# Patient Record
Sex: FEMALE | Race: WHITE | Employment: FULL TIME | URBAN - METROPOLITAN AREA
[De-identification: names, ages, dates, MRNs, and addresses within clinical notes are randomized per-mention and may not be internally consistent; named-entity substitution may affect disease eponyms.]

---

## 2018-08-04 ENCOUNTER — APPOINTMENT (OUTPATIENT)
Dept: GENERAL RADIOLOGY | Age: 33
End: 2018-08-04
Payer: COMMERCIAL

## 2018-08-04 ENCOUNTER — HOSPITAL ENCOUNTER (EMERGENCY)
Age: 33
Discharge: HOME | End: 2018-08-04
Attending: EMERGENCY MEDICINE
Payer: COMMERCIAL

## 2018-08-04 VITALS
HEART RATE: 78 BPM | DIASTOLIC BLOOD PRESSURE: 87 MMHG | HEIGHT: 60 IN | RESPIRATION RATE: 16 BRPM | WEIGHT: 189 LBS | BODY MASS INDEX: 37.11 KG/M2 | SYSTOLIC BLOOD PRESSURE: 134 MMHG | TEMPERATURE: 98.2 F | OXYGEN SATURATION: 100 %

## 2018-08-04 DIAGNOSIS — R06.02 SHORTNESS OF BREATH: Primary | ICD-10-CM

## 2018-08-04 DIAGNOSIS — J20.9 BRONCHOSPASM WITH BRONCHITIS, ACUTE: ICD-10-CM

## 2018-08-04 LAB
A/G RATIO: 1.4 (ref 1.1–2.2)
ALBUMIN SERPL-MCNC: 4.2 G/DL (ref 3.4–5)
ALP BLD-CCNC: 77 U/L (ref 40–129)
ALT SERPL-CCNC: 34 U/L (ref 10–40)
ANION GAP SERPL CALCULATED.3IONS-SCNC: 11 MMOL/L (ref 3–16)
AST SERPL-CCNC: 22 U/L (ref 15–37)
BACTERIA: ABNORMAL /HPF
BASOPHILS ABSOLUTE: 0 K/UL (ref 0–0.2)
BASOPHILS RELATIVE PERCENT: 0.2 %
BILIRUB SERPL-MCNC: 0.3 MG/DL (ref 0–1)
BILIRUBIN URINE: NEGATIVE
BLOOD, URINE: ABNORMAL
BUN BLDV-MCNC: 18 MG/DL (ref 7–20)
CALCIUM SERPL-MCNC: 8.7 MG/DL (ref 8.3–10.6)
CHLORIDE BLD-SCNC: 104 MMOL/L (ref 99–110)
CLARITY: ABNORMAL
CO2: 26 MMOL/L (ref 21–32)
COLOR: YELLOW
CREAT SERPL-MCNC: 0.7 MG/DL (ref 0.6–1.1)
EOSINOPHILS ABSOLUTE: 0.2 K/UL (ref 0–0.6)
EOSINOPHILS RELATIVE PERCENT: 2.3 %
EPITHELIAL CELLS, UA: ABNORMAL /HPF
GFR AFRICAN AMERICAN: >60
GFR NON-AFRICAN AMERICAN: >60
GLOBULIN: 3 G/DL
GLUCOSE BLD-MCNC: 119 MG/DL (ref 70–99)
GLUCOSE URINE: NEGATIVE MG/DL
HCG(URINE) PREGNANCY TEST: NEGATIVE
HCT VFR BLD CALC: 42.3 % (ref 36–48)
HEMOGLOBIN: 14.6 G/DL (ref 12–16)
KETONES, URINE: NEGATIVE MG/DL
LEUKOCYTE ESTERASE, URINE: NEGATIVE
LYMPHOCYTES ABSOLUTE: 1.8 K/UL (ref 1–5.1)
LYMPHOCYTES RELATIVE PERCENT: 26 %
MCH RBC QN AUTO: 32.9 PG (ref 26–34)
MCHC RBC AUTO-ENTMCNC: 34.5 G/DL (ref 31–36)
MCV RBC AUTO: 95.3 FL (ref 80–100)
MICROSCOPIC EXAMINATION: YES
MONOCYTES ABSOLUTE: 0.4 K/UL (ref 0–1.3)
MONOCYTES RELATIVE PERCENT: 5.7 %
NEUTROPHILS ABSOLUTE: 4.7 K/UL (ref 1.7–7.7)
NEUTROPHILS RELATIVE PERCENT: 65.8 %
NITRITE, URINE: NEGATIVE
PDW BLD-RTO: 13.8 % (ref 12.4–15.4)
PH UA: 5.5
PLATELET # BLD: 272 K/UL (ref 135–450)
PMV BLD AUTO: 7.8 FL (ref 5–10.5)
POTASSIUM SERPL-SCNC: 3.7 MMOL/L (ref 3.5–5.1)
PROTEIN UA: NEGATIVE MG/DL
RBC # BLD: 4.44 M/UL (ref 4–5.2)
RBC UA: ABNORMAL /HPF (ref 0–2)
SODIUM BLD-SCNC: 141 MMOL/L (ref 136–145)
SPECIFIC GRAVITY UA: 1.02
TOTAL PROTEIN: 7.2 G/DL (ref 6.4–8.2)
URINE TYPE: ABNORMAL
UROBILINOGEN, URINE: 0.2 E.U./DL
WBC # BLD: 7.1 K/UL (ref 4–11)
WBC UA: ABNORMAL /HPF (ref 0–5)

## 2018-08-04 PROCEDURE — 80053 COMPREHEN METABOLIC PANEL: CPT

## 2018-08-04 PROCEDURE — 6360000002 HC RX W HCPCS: Performed by: EMERGENCY MEDICINE

## 2018-08-04 PROCEDURE — 84703 CHORIONIC GONADOTROPIN ASSAY: CPT

## 2018-08-04 PROCEDURE — 71045 X-RAY EXAM CHEST 1 VIEW: CPT

## 2018-08-04 PROCEDURE — 85025 COMPLETE CBC W/AUTO DIFF WBC: CPT

## 2018-08-04 PROCEDURE — 99285 EMERGENCY DEPT VISIT HI MDM: CPT

## 2018-08-04 PROCEDURE — 96365 THER/PROPH/DIAG IV INF INIT: CPT

## 2018-08-04 PROCEDURE — 81001 URINALYSIS AUTO W/SCOPE: CPT

## 2018-08-04 PROCEDURE — 6370000000 HC RX 637 (ALT 250 FOR IP): Performed by: EMERGENCY MEDICINE

## 2018-08-04 RX ORDER — AZITHROMYCIN 250 MG/1
TABLET, FILM COATED ORAL
Qty: 1 PACKET | Refills: 0 | Status: SHIPPED | OUTPATIENT
Start: 2018-08-04 | End: 2018-08-08

## 2018-08-04 RX ORDER — PREDNISONE 50 MG/1
50 TABLET ORAL DAILY
Qty: 5 TABLET | Refills: 0 | Status: SHIPPED | OUTPATIENT
Start: 2018-08-04 | End: 2018-08-09

## 2018-08-04 RX ORDER — MAGNESIUM SULFATE IN WATER 40 MG/ML
2 INJECTION, SOLUTION INTRAVENOUS ONCE
Status: COMPLETED | OUTPATIENT
Start: 2018-08-04 | End: 2018-08-04

## 2018-08-04 RX ORDER — ALBUTEROL SULFATE 2.5 MG/3ML
2.5 SOLUTION RESPIRATORY (INHALATION) ONCE
Status: COMPLETED | OUTPATIENT
Start: 2018-08-04 | End: 2018-08-04

## 2018-08-04 RX ORDER — IPRATROPIUM BROMIDE AND ALBUTEROL SULFATE 2.5; .5 MG/3ML; MG/3ML
1 SOLUTION RESPIRATORY (INHALATION) ONCE
Status: COMPLETED | OUTPATIENT
Start: 2018-08-04 | End: 2018-08-04

## 2018-08-04 RX ORDER — PREDNISONE 20 MG/1
60 TABLET ORAL ONCE
Status: COMPLETED | OUTPATIENT
Start: 2018-08-04 | End: 2018-08-04

## 2018-08-04 RX ADMIN — MAGNESIUM SULFATE HEPTAHYDRATE 2 G: 40 INJECTION, SOLUTION INTRAVENOUS at 08:13

## 2018-08-04 RX ADMIN — ALBUTEROL SULFATE 2.5 MG: 2.5 SOLUTION RESPIRATORY (INHALATION) at 08:52

## 2018-08-04 RX ADMIN — IPRATROPIUM BROMIDE AND ALBUTEROL SULFATE 1 AMPULE: .5; 3 SOLUTION RESPIRATORY (INHALATION) at 08:13

## 2018-08-04 RX ADMIN — PREDNISONE 60 MG: 20 TABLET ORAL at 08:13

## 2018-08-04 NOTE — ED TRIAGE NOTES
Chief Complaint   Patient presents with    Shortness of Breath     Pt presents with c/o shortness of breath that started yesterday.  +cough and congestion. Has been told that she has COPD.   Denies fever  Denies n/v.

## 2018-08-04 NOTE — ED PROVIDER NOTES
Marital status:      Spouse name: N/A    Number of children: N/A    Years of education: N/A     Occupational History    Not on file. Social History Main Topics    Smoking status: Current Every Day Smoker     Packs/day: 3.00     Types: Cigarettes    Smokeless tobacco: Never Used    Alcohol use No    Drug use: Yes      Comment: narcotics    Sexual activity: Yes     Partners: Male     Other Topics Concern    Not on file     Social History Narrative    No narrative on file     No current facility-administered medications for this encounter. Current Outpatient Prescriptions   Medication Sig Dispense Refill    predniSONE (DELTASONE) 50 MG tablet Take 1 tablet by mouth daily for 5 days 5 tablet 0    azithromycin (ZITHROMAX Z-KENYA) 250 MG tablet Take 2 tablets (500 mg) on Day 1, and then take 1 tablet (250 mg) on days 2 through 5. 1 packet 0    albuterol sulfate HFA (PROVENTIL HFA) 108 (90 Base) MCG/ACT inhaler Inhale 2 puffs into the lungs every 4 hours as needed for Wheezing or Shortness of Breath (Space out to every 6 hours as symptoms improve) Space out to every 6 hours as symptoms improve. 1 Inhaler 0    guaiFENesin 400 MG tablet Take 1 tablet by mouth 4 times daily as needed for Cough. 56 tablet 0    Buprenorphine HCl-Naloxone HCl (SUBOXONE) 8-2 MG FILM Place 1 Film under the tongue 2 times daily. Allergies   Allergen Reactions    Penicillins Anaphylaxis    Cephalexin Hives    Tramadol Hives       Nursing Notes Reviewed    Physical Exam:  ED Triage Vitals [08/04/18 0748]   Enc Vitals Group      BP (!) 121/95      Pulse 80      Resp 16      Temp 98.2 °F (36.8 °C)      Temp Source Oral      SpO2 100 %      Weight 189 lb (85.7 kg)      Height 5' (1.524 m)      Head Circumference       Peak Flow       Pain Score       Pain Loc       Pain Edu? Excl. in [address]? My pulse ox interpretation is - normal    General appearance:  No acute distress.  Able to talk in sentences, does African American >60 >60    Calcium 8.7 8.3 - 10.6 mg/dL    Total Protein 7.2 6.4 - 8.2 g/dL    Alb 4.2 3.4 - 5.0 g/dL    Albumin/Globulin Ratio 1.4 1.1 - 2.2    Total Bilirubin 0.3 0.0 - 1.0 mg/dL    Alkaline Phosphatase 77 40 - 129 U/L    ALT 34 10 - 40 U/L    AST 22 15 - 37 U/L    Globulin 3.0 g/dL   Pregnancy, Urine   Result Value Ref Range    HCG(Urine) Pregnancy Test Negative Detects HCG level >20 MIU/mL   Urinalysis   Result Value Ref Range    Color, UA Yellow Straw/Yellow    Clarity, UA SL CLOUDY (A) Clear    Glucose, Ur Negative Negative mg/dL    Bilirubin Urine Negative Negative    Ketones, Urine Negative Negative mg/dL    Specific Gravity, UA 1.025 1.005 - 1.030    Blood, Urine TRACE-INTACT (A) Negative    pH, UA 5.5 5.0 - 8.0    Protein, UA Negative Negative mg/dL    Urobilinogen, Urine 0.2 <2.0 E.U./dL    Nitrite, Urine Negative Negative    Leukocyte Esterase, Urine Negative Negative    Microscopic Examination YES     Urine Type Not Specified    Microscopic Urinalysis   Result Value Ref Range    WBC, UA 3-5 0 - 5 /HPF    RBC, UA 3-5 (A) 0 - 2 /HPF    Epi Cells 10-20 /HPF    Bacteria, UA 2+ (A) /HPF      Radiographs (if obtained):  [] The following radiograph was interpreted by myself in the absence of a radiologist:   [x] Radiologist's Report Reviewed:  XR CHEST PORTABLE   Preliminary Result   No acute cardiopulmonary process               EKG (if obtained): (All EKG's are interpreted by myself in the absence of a cardiologist)    Chart review shows recent radiographs:  No results found. MDM:  70-year-old female with shortness of breath. I suspect this is viral in nature, however, cannot completely rule out an atypical bronchitis. She has responded nicely to breathing treatments. I will continue steroids. I have encouraged her to drink extra fluids and absolutely no smoking. She should  follow-up with primary care and can return for any problems or if worse. Clinical Impression:  1.  Shortness of breath    2. Bronchospasm with bronchitis, acute      Disposition referral (if applicable):  [facility]) Pre-Services  [phone]        Disposition medications (if applicable):  New Prescriptions    AZITHROMYCIN (ZITHROMAX Z-KENYA) 250 MG TABLET    Take 2 tablets (500 mg) on Day 1, and then take 1 tablet (250 mg) on days 2 through 5. PREDNISONE (DELTASONE) 50 MG TABLET    Take 1 tablet by mouth daily for 5 days       Comment: Please note this report has been produced using speech recognition software and may contain errors related to that system including errors in grammar, punctuation, and spelling, as well as words and phrases that may be inappropriate. If there are any questions or concerns please feel free to contact the dictating provider for clarification. Lexi Livingston MD  08/04/18 6649

## 2018-08-04 NOTE — LETTER
[facility] [place] [facility]  [address] [address]  Phone: [phone]               August 4, 2018    Patient: Nury Brady   YOB: 1985   Date of Visit: 8/4/2018       To Whom It May Concern:    Nury Brady was seen and treated in our emergency department on 8/4/2018. She may return to work on 08/06/2018.       Sincerely,       Rosa Santiago RN         Signature:__________________________________

## 2019-04-12 ENCOUNTER — HOSPITAL ENCOUNTER (EMERGENCY)
Age: 34
Discharge: HOME | End: 2019-04-13
Payer: COMMERCIAL

## 2019-04-12 ENCOUNTER — APPOINTMENT (OUTPATIENT)
Dept: GENERAL RADIOLOGY | Age: 34
End: 2019-04-12
Payer: COMMERCIAL

## 2019-04-12 VITALS
BODY MASS INDEX: 33.57 KG/M2 | HEIGHT: 60 IN | SYSTOLIC BLOOD PRESSURE: 129 MMHG | DIASTOLIC BLOOD PRESSURE: 81 MMHG | RESPIRATION RATE: 20 BRPM | HEART RATE: 103 BPM | TEMPERATURE: 98.1 F | OXYGEN SATURATION: 99 % | WEIGHT: 171 LBS

## 2019-04-12 DIAGNOSIS — R06.02 SOB (SHORTNESS OF BREATH): Primary | ICD-10-CM

## 2019-04-12 DIAGNOSIS — Z72.0 TOBACCO ABUSE: ICD-10-CM

## 2019-04-12 PROCEDURE — 99284 EMERGENCY DEPT VISIT MOD MDM: CPT

## 2019-04-12 PROCEDURE — 71046 X-RAY EXAM CHEST 2 VIEWS: CPT

## 2019-04-13 RX ORDER — IPRATROPIUM BROMIDE AND ALBUTEROL SULFATE 2.5; .5 MG/3ML; MG/3ML
1 SOLUTION RESPIRATORY (INHALATION) ONCE
Status: DISCONTINUED | OUTPATIENT
Start: 2019-04-13 | End: 2019-04-13

## 2019-04-13 RX ORDER — PREDNISONE 20 MG/1
60 TABLET ORAL ONCE
Status: DISCONTINUED | OUTPATIENT
Start: 2019-04-13 | End: 2019-04-13

## 2019-04-13 RX ORDER — ALBUTEROL SULFATE 2.5 MG/3ML
5 SOLUTION RESPIRATORY (INHALATION) ONCE
Status: DISCONTINUED | OUTPATIENT
Start: 2019-04-13 | End: 2019-04-13

## 2019-04-13 NOTE — ED PROVIDER NOTES
Evaluated by [id number] [facility] Provider    [facility]  ED  eMERGENCY dEPARTMENT eNCOUnter      CHIEFCOMPLAINT  Shortness of Breath (states he is a heavy smoker and she just can't get a deep breath in)    DIANNE Geronimo is a 28 y.o. female who presents to the emergency department with complaints ofshortness of breath. Feels like she cannot take a deep breath, She was fine yesterday and woke up like this today. Smokes 1-1.5 packs a day. Was 3 ppd smoker. Has been using the prescribed inhalers at home. States she took 3 nebulizer treatments at home and she is feeling jittery from it. Patient denies any chest pain, patient denies abdominal pain, nausea, vomiting, diarrhea. Patient denies any fever or chills. Thepatient is currently rating their pain as 0/10. Treatments tried prior to arrival in the ED include: as above. The patient denies other complaints, modifying factors orassociated symptoms. The patient arrived to the ED via private car. Patient is accompanied by family who is bedside for the visit. PAST MEDICAL HISTORY    Past Medical History:   Diagnosis Date    Adhesions; postoperative     post umbilical hernia.     Chronic abdominal pain     Hernia of unspecified site of abdominal cavity without mention of obstruction or gangrene     Narcotic abuse ([address]     Substance abuse ([address]        SURGICAL HISTORY    Past Surgical History:   Procedure Laterality Date     SECTION      CHOLECYSTECTOMY  [id number]    HERNIA REPAIR      HYSTERECTOMY      PELVIC LAPAROSCOPY         CURRENT MEDICATIONS    Current Outpatient Rx   Medication Sig Dispense Refill    albuterol-ipratropium (COMBIVENT RESPIMAT)  MCG/ACT AERS inhaler Inhale 1 puff into the lungs every 6 hours      albuterol sulfate HFA (PROVENTIL HFA) 108 (90 Base) MCG/ACT inhaler Inhale 2 puffs into the lungs every 4 hours as needed for Wheezing or Shortness of Breath (Space out to every 6 hours as symptoms improve) Space out to every 6 hours as symptoms improve. 1 Inhaler 0    Buprenorphine HCl-Naloxone HCl (SUBOXONE) 8-2 MG FILM Place 1 Film under the tongue 2 times daily.          ALLERGIES    Allergies   Allergen Reactions    Penicillins Anaphylaxis    Cephalexin Hives    Tramadol Hives       FAMILY HISTORY    Family History   Problem Relation Age of Onset    Diabetes Mother     Cancer Father        SOCIAL HISTORY    Social History     Socioeconomic History    Marital status:      Spouse name: None    Number of children: None    Years of education: None    Highest education level: None   Occupational History    None   Social Needs    Financial resource strain: None    Food insecurity:     Worry: None     Inability: None    Transportation needs:     Medical: None     Non-medical: None   Tobacco Use    Smoking status: Current Every Day Smoker     Packs/day: 1.25     Types: Cigarettes    Smokeless tobacco: Never Used   Substance and Sexual Activity    Alcohol use: No    Drug use: Yes     Comment: narcotics    Sexual activity: Yes     Partners: Male   Lifestyle    Physical activity:     Days per week: None     Minutes per session: None    Stress: None   Relationships    Social connections:     Talks on phone: None     Gets together: None     Attends [employer] service: None     Active member of club or organization: None     Attends meetings of clubs or organizations: None     Relationship status: None    Intimate partner violence:     Fear of current or ex partner: None     Emotionally abused: None     Physically abused: None     Forced sexual activity: None   Other Topics Concern    None   Social History Narrative    None       REVIEW OF SYSTEMS    10 systems reviewed, pertinent positives per HPI otherwise noted to be negative    PHYSICAL EXAM  Physical Exam  Vitals:    04/12/19 2246   BP: 129/81   Pulse: 103   Resp: 20   Temp: 98.1 °F (36.7 °C)   SpO2: 99% GENERAL: Patient is well-developed, well-nourished. Awake and alert. Cooperative. Resting in bed. No apparent distress. HEENT:  Normocephalic, atraumatic. Conjunctiva appear normal. Sclera is non-icteric. External ears are normal.    NECK: Supple with normal ROM. Trachea midline  LUNGS: Equal and symmetric chest rise. Breathing is unlabored. Speaking in full sentences. Lungs are clear but diminished bilaterally to auscultation. Without wheezing, rales, or rhonchi. Voice is hoarse sounding. CADIOVASCULAR:  Tachycardic rate and rhythm. Normal S1-S2 sounds. No murmurs, rubs, or gallops. Capillary refill is brisk in all 4extremities. Bilateral lower extremities are equal in size, there is no swelling observed. There is no tenderness to palpation. There is no erythema observed or warmth palpated. GI: Soft, nontender, nondistended with positive bowelsounds. No rebound tenderness, guarding or any peritoneal signs. No masses or hepatosplenomegaly   MUSCULOSKELETAL:  No gross deformities or trauma noted. Moving allextremities equally and appropriately. Normal ROM. SKIN: Warm/dry. Skin is intact. Norashes/lesions noted. PSYCHIATRIC: Mood and affect appropriate. Speech is clear andarticulate. NEUROLOGIC: Alert and oriented. No focal motor or sensory deficits. Gait was observed and isnormal.    LABS  I havereviewed all labs for this visit. No results found for this visit on 04/12/19. RADIOLOGY    XR CHEST STANDARD (2 VW)   Final Result   No acute findings. ED COURSE/MDM  Patient seen and evaluated. Old records reviewed. Diagnostic testing reviewed and results discussed. I have evaluated this patient. My supervising physician was available for consultation. Lucas Muller presented to the ED with the above noted complaints. Physical exam reveals the patient to have diminished breath sounds throughout all lung fields. Her voice is very hoarse sounding.  She is well saturated on RA at 99%. CXR is without acute findings. I suspect that the patient likely has an undiagnosed COPD given her very heavy smoking history. I advised the patient that I would give her breathing treatments and start her on a steroid. Patient stated to me that she had taken 3 breathing treatments prior to coming in, this likely accounts for her tachycardia. She was refusing any more breathing treatments at this time. She also stated that she did not want to take the steroid and was refusing it as it is not helped her in the past.  I advised her for the current symptoms she is reporting in her physical exam breathing treatments and steroids is the typical treatment. Patient stated that she would just continue her breathing treatments around the clock at home. At this point I do not feel the patient requires further work up and it is reasonable to discharge the patient. Please refer to AVS for further details regarding dischargeinstructions. A discussion was had with the patient regarding diagnosis, diagnostic testing results,treatment/ plan of care, and follow up. All questions were answered. Patient will follow up as directed for further evaluation/treatment. The patient was given strict return precautions as we discussed symptoms that wouldnecessitate return to the ED. Patient will return to ED for new/worsening symptoms. The patient verbalized their understanding and agreement with the above plan. I estimate there is LOW risk for PULMONARY EMBOLISM, ACUTE CORONARY SYNDROME, OR THORACIC AORTIC DISSECTION, thus I consider the discharge disposition reasonable. Shayy Garcia and I have discussed the diagnosisand risks, and we agree with discharging home to follow-up with their primary doctor. We also discussed returning to the Emergency Department immediately if new or worsening symptoms occur.  We have discussed the symptomswhich are most concerning (e.g., bloody sputum, fever, worsening pain or shortness of breath, vomiting) that necessitate immediate return. Patient was sent home with a prescription for below medication/s. I did [place] patient on appropriate use of these medication. Discharge Medication List as of 4/13/2019 12:53 AM            CLINICAL IMPRESSION    1. SOB (shortness of breath)    2. Tobacco abuse           Discharge Vitals:  Blood pressure 129/81, pulse 103, temperature 98.1 °F (36.7 °C), temperature source Oral, resp. rate 20, height 5' (1.524 m), weight 171 lb (77.6 kg), SpO2 99 %, not currently breastfeeding. FOLLOW UP  Diaz Castillo MD  [address] [place] [address]  [phone]    Call in 3 days  For further evaluation    [facility]  ED  [facility]  [address] [phone]  Go to   If symptoms worsen      DISPOSITION  Patient was discharged to home in good condition. Comment: Please note this report has been produced using speech recognition software and may contain errors related to that system including errorsin grammar, punctuation, and spelling, as well as words and phrases that may be inappropriate. If there are any questions or concerns please feel free to contact the dictating provider for clarification.         KATIA Benjamin - CNP  04/13/19 0128

## 2019-05-28 ENCOUNTER — OFFICE VISIT (OUTPATIENT)
Dept: FAMILY MEDICINE CLINIC | Age: 34
End: 2019-05-28
Payer: COMMERCIAL

## 2019-05-28 VITALS
HEART RATE: 76 BPM | BODY MASS INDEX: 39.88 KG/M2 | OXYGEN SATURATION: 95 % | DIASTOLIC BLOOD PRESSURE: 78 MMHG | SYSTOLIC BLOOD PRESSURE: 120 MMHG | WEIGHT: 204.2 LBS | TEMPERATURE: 98.4 F

## 2019-05-28 DIAGNOSIS — J44.9 CHRONIC OBSTRUCTIVE PULMONARY DISEASE, UNSPECIFIED COPD TYPE (HCC): Primary | ICD-10-CM

## 2019-05-28 DIAGNOSIS — Z13.220 LIPID SCREENING: ICD-10-CM

## 2019-05-28 DIAGNOSIS — Z13.1 DIABETES MELLITUS SCREENING: ICD-10-CM

## 2019-05-28 DIAGNOSIS — F17.200 TOBACCO USE DISORDER: ICD-10-CM

## 2019-05-28 PROCEDURE — G8427 DOCREV CUR MEDS BY ELIG CLIN: HCPCS | Performed by: FAMILY MEDICINE

## 2019-05-28 PROCEDURE — G8417 CALC BMI ABV UP PARAM F/U: HCPCS | Performed by: FAMILY MEDICINE

## 2019-05-28 PROCEDURE — G8926 SPIRO NO PERF OR DOC: HCPCS | Performed by: FAMILY MEDICINE

## 2019-05-28 PROCEDURE — 99203 OFFICE O/P NEW LOW 30 MIN: CPT | Performed by: FAMILY MEDICINE

## 2019-05-28 PROCEDURE — 36415 COLL VENOUS BLD VENIPUNCTURE: CPT | Performed by: FAMILY MEDICINE

## 2019-05-28 PROCEDURE — 3023F SPIROM DOC REV: CPT | Performed by: FAMILY MEDICINE

## 2019-05-28 PROCEDURE — 4004F PT TOBACCO SCREEN RCVD TLK: CPT | Performed by: FAMILY MEDICINE

## 2019-05-28 RX ORDER — ALBUTEROL SULFATE 90 UG/1
2 AEROSOL, METERED RESPIRATORY (INHALATION) EVERY 4 HOURS PRN
Qty: 1 INHALER | Refills: 0 | Status: SHIPPED | OUTPATIENT
Start: 2019-05-28 | End: 2020-02-13

## 2019-05-28 ASSESSMENT — PATIENT HEALTH QUESTIONNAIRE - PHQ9
1. LITTLE INTEREST OR PLEASURE IN DOING THINGS: 0
SUM OF ALL RESPONSES TO PHQ9 QUESTIONS 1 & 2: 1
SUM OF ALL RESPONSES TO PHQ QUESTIONS 1-9: 1
2. FEELING DOWN, DEPRESSED OR HOPELESS: 1
SUM OF ALL RESPONSES TO PHQ QUESTIONS 1-9: 1

## 2019-05-28 ASSESSMENT — ENCOUNTER SYMPTOMS: SHORTNESS OF BREATH: 1

## 2019-05-28 NOTE — PROGRESS NOTES
sounds. Pulmonary/Chest: Effort normal. No stridor. No respiratory distress. She has no wheezes. Neurological: She is alert and oriented to person, place, and time. Skin: Skin is warm and dry. She is not diaphoretic. Vitals reviewed. ASSESSMENT:   Well Adult, See encounter diagnoses  Jos Quintero was seen today for establish care. Diagnoses and all orders for this visit:    Chronic obstructive pulmonary disease, unspecified COPD type ([address]  -     albuterol sulfate HFA (PROVENTIL HFA) 108 (90 Base) MCG/ACT inhaler; Inhale 2 puffs into the lungs every 4 hours as needed for Wheezing or Shortness of Breath (Space out to every 6 hours as symptoms improve)  -     tiotropium (SPIRIVA RESPIMAT) 1.25 MCG/ACT AERS inhaler; Inhale 2 puffs into the lungs daily  -     Full PFT Study With Bronchodilator; Future  -     ALPHA-1-ANTITRYPSIN  -     Elyse Villa MD, Pulmonary, [facility]    Lipid screening  -     Lipid Panel  -     Comprehensive Metabolic Panel    Diabetes mellitus screening  -     Hemoglobin A1C    Tobacco use disorder  Counseled on smoking cessation. Patient is pre-contemplative. Plan:   See orders and medications filed with this encounter. Return in about 3 months (around 8/28/2019).

## 2019-05-29 LAB
A/G RATIO: 1.6 (ref 1.1–2.2)
ALBUMIN SERPL-MCNC: 4.4 G/DL (ref 3.4–5)
ALP BLD-CCNC: 80 U/L (ref 40–129)
ALT SERPL-CCNC: 9 U/L (ref 10–40)
ANION GAP SERPL CALCULATED.3IONS-SCNC: 13 MMOL/L (ref 3–16)
AST SERPL-CCNC: 11 U/L (ref 15–37)
BILIRUB SERPL-MCNC: 0.3 MG/DL (ref 0–1)
BUN BLDV-MCNC: 13 MG/DL (ref 7–20)
CALCIUM SERPL-MCNC: 9.3 MG/DL (ref 8.3–10.6)
CHLORIDE BLD-SCNC: 104 MMOL/L (ref 99–110)
CHOLESTEROL, TOTAL: 128 MG/DL (ref 0–199)
CO2: 25 MMOL/L (ref 21–32)
CREAT SERPL-MCNC: 0.9 MG/DL (ref 0.6–1.1)
ESTIMATED AVERAGE GLUCOSE: 105.4 MG/DL
GFR AFRICAN AMERICAN: >60
GFR NON-AFRICAN AMERICAN: >60
GLOBULIN: 2.7 G/DL
GLUCOSE BLD-MCNC: 82 MG/DL (ref 70–99)
HBA1C MFR BLD: 5.3 %
HDLC SERPL-MCNC: 50 MG/DL (ref 40–60)
LDL CHOLESTEROL CALCULATED: 64 MG/DL
POTASSIUM SERPL-SCNC: 3.8 MMOL/L (ref 3.5–5.1)
SODIUM BLD-SCNC: 142 MMOL/L (ref 136–145)
TOTAL PROTEIN: 7.1 G/DL (ref 6.4–8.2)
TRIGL SERPL-MCNC: 70 MG/DL (ref 0–150)
VLDLC SERPL CALC-MCNC: 14 MG/DL

## 2019-05-30 LAB — ALPHA-1 ANTITRYPSIN: 143 MG/DL (ref 90–200)

## 2019-06-05 ENCOUNTER — OFFICE VISIT (OUTPATIENT)
Dept: PULMONOLOGY | Age: 34
End: 2019-06-05
Payer: COMMERCIAL

## 2019-06-05 VITALS
OXYGEN SATURATION: 99 % | SYSTOLIC BLOOD PRESSURE: 120 MMHG | BODY MASS INDEX: 39.85 KG/M2 | DIASTOLIC BLOOD PRESSURE: 76 MMHG | HEART RATE: 85 BPM | WEIGHT: 203 LBS | HEIGHT: 60 IN

## 2019-06-05 DIAGNOSIS — F17.200 TOBACCO DEPENDENCE: ICD-10-CM

## 2019-06-05 DIAGNOSIS — R06.02 SHORTNESS OF BREATH: ICD-10-CM

## 2019-06-05 DIAGNOSIS — R06.1 INSPIRATORY STRIDOR: Primary | ICD-10-CM

## 2019-06-05 PROCEDURE — 99244 OFF/OP CNSLTJ NEW/EST MOD 40: CPT | Performed by: INTERNAL MEDICINE

## 2019-06-05 PROCEDURE — G8417 CALC BMI ABV UP PARAM F/U: HCPCS | Performed by: INTERNAL MEDICINE

## 2019-06-05 PROCEDURE — G8427 DOCREV CUR MEDS BY ELIG CLIN: HCPCS | Performed by: INTERNAL MEDICINE

## 2019-06-05 RX ORDER — IPRATROPIUM BROMIDE AND ALBUTEROL SULFATE 2.5; .5 MG/3ML; MG/3ML
1 SOLUTION RESPIRATORY (INHALATION) EVERY 4 HOURS
COMMUNITY

## 2019-06-05 ASSESSMENT — SLEEP AND FATIGUE QUESTIONNAIRES
HOW LIKELY ARE YOU TO NOD OFF OR FALL ASLEEP WHILE LYING DOWN TO REST IN THE AFTERNOON WHEN CIRCUMSTANCES PERMIT: 2
ESS TOTAL SCORE: 6
HOW LIKELY ARE YOU TO NOD OFF OR FALL ASLEEP WHILE SITTING QUIETLY AFTER LUNCH WITHOUT ALCOHOL: 0
HOW LIKELY ARE YOU TO NOD OFF OR FALL ASLEEP IN A CAR, WHILE STOPPED FOR A FEW MINUTES IN TRAFFIC: 0
HOW LIKELY ARE YOU TO NOD OFF OR FALL ASLEEP WHILE SITTING AND READING: 1
NECK CIRCUMFERENCE (INCHES): 15.5
HOW LIKELY ARE YOU TO NOD OFF OR FALL ASLEEP WHEN YOU ARE A PASSENGER IN A CAR FOR AN HOUR WITHOUT A BREAK: 1
HOW LIKELY ARE YOU TO NOD OFF OR FALL ASLEEP WHILE SITTING INACTIVE IN A PUBLIC PLACE: 0
HOW LIKELY ARE YOU TO NOD OFF OR FALL ASLEEP WHILE SITTING AND TALKING TO SOMEONE: 0
HOW LIKELY ARE YOU TO NOD OFF OR FALL ASLEEP WHILE WATCHING TV: 2

## 2019-06-05 NOTE — PATIENT INSTRUCTIONS
Remember to bring all pulmonary medications to your next appointment with the office. Please keep all of your future appointments scheduled by [address] [facility] office. Out of respect for other patients and providers, you may be asked to reschedule your appointment if you arrive later than your scheduled appointment time. Appointments cancelled less than 24hrs in advance will be considered a no show. Patients with three missed appointments within 1 year or four missed appointments within 2 years can be dismissed from the practice.

## 2019-06-07 ASSESSMENT — ENCOUNTER SYMPTOMS
DIARRHEA: 0
WHEEZING: 1
EYE PAIN: 0
ABDOMINAL PAIN: 0
EYE DISCHARGE: 0
EYE ITCHING: 0
CHEST TIGHTNESS: 0
VOICE CHANGE: 0
STRIDOR: 0
CHOKING: 0
SHORTNESS OF BREATH: 1
CONSTIPATION: 0
SORE THROAT: 0

## 2019-06-07 NOTE — PROGRESS NOTES
Pulmonary Outpatient Note   Renaldo Paulino MD       2019    Chief Complaint:  New Patient (COPD - SOB, nebulizer breathing treatments not helping as much as they used to, having trouble taking in deep breaths)     HPI:   27y.o. year old female here for further evaluation of shortness of breath. For over six months she has been experiencing episodic shortness of breath now on a daily basis. Will occur randomly even while at rest but on occasion will occur with mild to moderate exertion as well. Has associated cough and congestion. Family that accompanied her to visit describe what sounds like wheezing episodes when she is short of breath. Her voice was hoarse sounding but she insists this has been her normal voice and denies any difficulty swallowing or speaking. Has been tried on nebulized bronchodilators without significant relief of symptoms. Past Medical History:   Diagnosis Date    Adhesions; postoperative     post umbilical hernia.     Chronic abdominal pain     Hernia of unspecified site of abdominal cavity without mention of obstruction or gangrene     Narcotic abuse ([address]     Sub[address] [address]        Past Surgical History:   Procedure Laterality Date     SECTION      CHOLECYSTECTOMY  [id number]    HERNIA REPAIR      HERNIA REPAIR      HYSTERECTOMY      HYSTERECTOMY, TOTAL ABDOMINAL      PELVIC LAPAROSCOPY         Social History     Tobacco Use    Smoking status: Current Every Day Smoker     Packs/day: 1.25     Years: 21.00     Pack years: 26.25     Types: Cigarettes    Smokeless tobacco: Never Used   Substance Use Topics    Alcohol use: No          Family History   Problem Relation Age of Onset    Diabetes Mother     Cancer Father          Current Outpatient Medications:     Biotin w/ Vitamins C & E (HAIR/SKIN/NAILS PO), Take by mouth, Disp: , Rfl:     ipratropium-albuterol (DUONEB) 0.5-2.5 (3) MG/3ML SOLN nebulizer solution, Take 1 vial by nebulization every 4 hours, Disp: , Rfl:     albuterol sulfate HFA (PROVENTIL HFA) 108 (90 Base) MCG/ACT inhaler, Inhale 2 puffs into the lungs every 4 hours as needed for Wheezing or Shortness of Breath (Space out to every 6 hours as symptoms improve), Disp: 1 Inhaler, Rfl: 0    tiotropium (SPIRIVA RESPIMAT) 1.25 MCG/ACT AERS inhaler, Inhale 2 puffs into the lungs daily, Disp: 1 Inhaler, Rfl: 0    Buprenorphine HCl-Naloxone HCl (SUBOXONE) 8-2 MG FILM, Place 1 Film under the tongue 2 times daily. , Disp: , Rfl:     Penicillins; Cephalexin; and Tramadol    Vitals:    06/05/19 1519   BP: 120/76   Site: Left Upper Arm   Position: Sitting   Cuff Size: Large Adult   Pulse: 85   SpO2: 99%   Weight: 203 lb (92.1 kg)   Height: 5' (1.524 m)       Review of Systems   Constitutional: Negative for chills, fever and unexpected weight change. HENT: Negative for mouth sores, sore throat and voice change. Eyes: Negative for pain, discharge and itching. Respiratory: Positive for shortness of breath and wheezing. Negative for choking, chest tightness and stridor. Cardiovascular: Negative for chest pain, palpitations and leg swelling. Gastrointestinal: Negative for abdominal pain, constipation and diarrhea. Endocrine: Negative for cold intolerance, heat intolerance and polydipsia. Genitourinary: Negative for dysuria, frequency and hematuria. Musculoskeletal: Negative for gait problem, joint swelling and neck stiffness. Neurological: Negative for dizziness, numbness and headaches. Psychiatric/Behavioral: Negative for agitation, confusion and hallucinations. Physical Exam   Constitutional: She appears well-developed and well-nourished. No distress. HENT:   Head: Normocephalic and atraumatic. Mouth/Throat: Oropharynx is clear and moist. No oropharyngeal exudate. Eyes: Pupils are equal, round, and reactive to light. EOM are normal.   Neck: Neck supple. No JVD present. Cardiovascular: Normal heart sounds.  Exam reveals no gallop and no friction rub. No murmur heard. Pulmonary/Chest: Effort normal. She has no wheezes. She has no rales. Equal chest rise and expansion bilaterally   Abdominal: Soft. Bowel sounds are normal. She exhibits no distension. There is no tenderness. Musculoskeletal: Normal range of motion. She exhibits no edema. Lymphadenopathy:     She has no cervical adenopathy. Neurological: She is alert. No cranial nerve deficit. CN 2-12 grossly intact   Skin: Skin is warm and dry. No rash noted. She is not diaphoretic. CXR personally reviewed, no acute process. ASSESSMENT:    1. Inspiratory stridor    2. Shortness of breath    3.  Tobacco dependence      PLAN:    -Further clarify reason for respiratory symptoms with PFTs  -Check CT neck and chest to look for airway abnormality  -Continue bronchodilators for now  -Counseled on tobacco cessation   -Return to clinic after above testing    Orders Placed This Encounter   Procedures    CT Chest WO Contrast    CT SOFT TISSUE NECK WO CONTRAST    Full PFT Study With Bronchodilator       Rossy Connelly MD

## 2019-06-12 ENCOUNTER — TELEPHONE (OUTPATIENT)
Dept: PULMONOLOGY | Age: 34
End: 2019-06-12

## 2019-06-12 DIAGNOSIS — R06.1 INSPIRATORY STRIDOR: Primary | ICD-10-CM

## 2019-06-18 ENCOUNTER — HOSPITAL ENCOUNTER (OUTPATIENT)
Dept: CT IMAGING | Age: 34
Discharge: HOME | End: 2019-06-18
Payer: COMMERCIAL

## 2019-06-18 ENCOUNTER — HOSPITAL ENCOUNTER (OUTPATIENT)
Dept: PULMONOLOGY | Age: 34
Discharge: HOME | End: 2019-06-18
Payer: COMMERCIAL

## 2019-06-18 VITALS — OXYGEN SATURATION: 98 %

## 2019-06-18 DIAGNOSIS — R06.1 INSPIRATORY STRIDOR: ICD-10-CM

## 2019-06-18 DIAGNOSIS — J44.9 CHRONIC OBSTRUCTIVE PULMONARY DISEASE, UNSPECIFIED COPD TYPE (HCC): ICD-10-CM

## 2019-06-18 DIAGNOSIS — R06.02 SHORTNESS OF BREATH: ICD-10-CM

## 2019-06-18 PROCEDURE — 70491 CT SOFT TISSUE NECK W/DYE: CPT

## 2019-06-18 PROCEDURE — 71260 CT THORAX DX C+: CPT

## 2019-06-18 PROCEDURE — 6360000004 HC RX CONTRAST MEDICATION: Performed by: INTERNAL MEDICINE

## 2019-06-18 PROCEDURE — 94726 PLETHYSMOGRAPHY LUNG VOLUMES: CPT

## 2019-06-18 PROCEDURE — 94729 DIFFUSING CAPACITY: CPT

## 2019-06-18 PROCEDURE — 94060 EVALUATION OF WHEEZING: CPT

## 2019-06-18 PROCEDURE — 94760 N-INVAS EAR/PLS OXIMETRY 1: CPT

## 2019-06-18 PROCEDURE — 6370000000 HC RX 637 (ALT 250 FOR IP): Performed by: INTERNAL MEDICINE

## 2019-06-18 RX ORDER — ALBUTEROL SULFATE 90 UG/1
4 AEROSOL, METERED RESPIRATORY (INHALATION) ONCE
Status: COMPLETED | OUTPATIENT
Start: 2019-06-18 | End: 2019-06-18

## 2019-06-18 RX ADMIN — IOPAMIDOL 120 ML: 755 INJECTION, SOLUTION INTRAVENOUS at 15:33

## 2019-06-18 RX ADMIN — Medication 4 PUFF: at 13:48

## 2019-06-19 ENCOUNTER — TELEPHONE (OUTPATIENT)
Dept: PULMONOLOGY | Age: 34
End: 2019-06-19

## 2019-06-19 LAB
DLCO %PRED: 88 %
DLCO PRED: NORMAL ML/MIN/MMHG
DLCO/VA %PRED: NORMAL %
DLCO/VA PRED: NORMAL ML/MIN/MMHG
DLCO/VA: NORMAL ML/MIN/MMHG
DLCO: NORMAL ML/MIN/MMHG
EXPIRATORY TIME-POST: NORMAL SEC
EXPIRATORY TIME: NORMAL SEC
FEF 25-75% %CHNG: NORMAL
FEF 25-75% %PRED-POST: NORMAL %
FEF 25-75% %PRED-PRE: NORMAL L/SEC
FEF 25-75% PRED: NORMAL L/SEC
FEF 25-75%-POST: NORMAL L/SEC
FEF 25-75%-PRE: NORMAL L/SEC
FEV1 %PRED-POST: 64 %
FEV1 %PRED-PRE: 78 %
FEV1 PRED: NORMAL L
FEV1-POST: NORMAL L
FEV1-PRE: NORMAL L
FEV1/FVC %PRED-POST: NORMAL %
FEV1/FVC %PRED-PRE: NORMAL %
FEV1/FVC PRED: NORMAL %
FEV1/FVC-POST: 1.1 %
FEV1/FVC-PRE: 1.51 %
FVC %PRED-POST: 59 L
FVC %PRED-PRE: 68 %
FVC PRED: NORMAL L
FVC-POST: NORMAL L
FVC-PRE: NORMAL L
GAW %PRED: NORMAL %
GAW PRED: NORMAL L/S/CMH2O
GAW: NORMAL L/S/CMH2O
IC %PRED: NORMAL %
IC PRED: NORMAL L
IC: NORMAL L
MEP: NORMAL
MIP: NORMAL
MVV %PRED-PRE: NORMAL %
MVV PRED: NORMAL L/MIN
MVV-PRE: NORMAL L/MIN
PEF %PRED-POST: NORMAL %
PEF %PRED-PRE: NORMAL L/SEC
PEF PRED: NORMAL L/SEC
PEF%CHNG: NORMAL
PEF-POST: NORMAL L/SEC
PEF-PRE: NORMAL L/SEC
RAW %PRED: NORMAL %
RAW PRED: NORMAL CMH2O/L/S
RAW: NORMAL CMH2O/L/S
RV %PRED: NORMAL %
RV PRED: NORMAL L
RV: NORMAL L
SVC %PRED: NORMAL %
SVC PRED: NORMAL L
SVC: NORMAL L
TLC %PRED: 389 %
TLC PRED: NORMAL L
TLC: NORMAL L
VA %PRED: NORMAL %
VA PRED: NORMAL L
VA: NORMAL L
VTG %PRED: NORMAL %
VTG PRED: NORMAL L
VTG: NORMAL L

## 2019-06-19 ASSESSMENT — PULMONARY FUNCTION TESTS
FEV1_PERCENT_PREDICTED_POST: 64
FVC_PERCENT_PREDICTED_PRE: 68
FEV1_PERCENT_PREDICTED_PRE: 78
FVC_PERCENT_PREDICTED_POST: 59
FEV1/FVC_POST: 1.10
FEV1/FVC_PRE: 1.51

## 2019-06-19 NOTE — TELEPHONE ENCOUNTER
Patient did not show for Ct results appointment  with Dr. Sherry Dixon on 6/19/19  Could not get coverage at work    Same Day Cancellation: Yes    Patient rescheduled:  Yes    New appointment: 6/27/19    Patient was also no show on: N/A    LOV 6/5/19

## 2019-06-20 NOTE — PROCEDURES
[place] [facility]                               PULMONARY FUNCTION    PATIENT NAME: Ethan Vega                    :        1985  MED REC NO:   [id number]                          ROOM:  ACCOUNT NO:   [de-identified]                           ADMIT DATE: 2019  PROVIDER:     Mariah Díaz MD    DATE OF PROCEDURE:  2019    PFT    Spirometry showed FVC 2.95 L, 87% predicted, FEV1 1.92 L, 68% predicted  with the FEV1/FVC ratio of 65%. There was no response to  bronchodilator. Lung volume showed hyperinflation and air trapping. Diffusion capacity was 88% predicted. IMPRESSION:  PFT shows a mild obstructive defect with significant  hyperinflation and air trapping. This likely represents COPD, with  asthma being in the differential.  Clinical correlation is recommended.         Emma Ramirez MD    D: 2019 17:05:29       T: 2019 19:26:59     [email or web address]  Job#: [id number]     Doc#: [id number]    CC:  Lizz Rush MD

## 2019-06-25 DIAGNOSIS — J44.9 CHRONIC OBSTRUCTIVE PULMONARY DISEASE, UNSPECIFIED COPD TYPE (HCC): ICD-10-CM

## 2019-06-25 RX ORDER — TIOTROPIUM BROMIDE INHALATION SPRAY 1.56 UG/1
SPRAY, METERED RESPIRATORY (INHALATION)
Qty: 4 G | Refills: 0 | Status: SHIPPED | OUTPATIENT
Start: 2019-06-25

## 2019-06-27 ENCOUNTER — TELEPHONE (OUTPATIENT)
Dept: PULMONOLOGY | Age: 34
End: 2019-06-27

## 2019-07-11 ENCOUNTER — TELEPHONE (OUTPATIENT)
Dept: PULMONOLOGY | Age: 34
End: 2019-07-11

## 2019-07-26 ENCOUNTER — HOSPITAL ENCOUNTER (EMERGENCY)
Age: 34
Discharge: HOME | End: 2019-07-26
Payer: COMMERCIAL

## 2019-07-26 VITALS
RESPIRATION RATE: 20 BRPM | TEMPERATURE: 99.7 F | SYSTOLIC BLOOD PRESSURE: 129 MMHG | WEIGHT: 179 LBS | DIASTOLIC BLOOD PRESSURE: 109 MMHG | HEART RATE: 72 BPM | HEIGHT: 61 IN | OXYGEN SATURATION: 100 % | BODY MASS INDEX: 33.79 KG/M2

## 2019-07-26 DIAGNOSIS — S61.412A LACERATION OF LEFT HAND WITHOUT FOREIGN BODY, INITIAL ENCOUNTER: Primary | ICD-10-CM

## 2019-07-26 PROCEDURE — 99283 EMERGENCY DEPT VISIT LOW MDM: CPT

## 2019-07-26 PROCEDURE — 4500000023 HC ED LEVEL 3 PROCEDURE

## 2019-07-26 RX ORDER — DOXYCYCLINE 100 MG/1
100 TABLET ORAL 2 TIMES DAILY
Qty: 14 TABLET | Refills: 0 | Status: SHIPPED | OUTPATIENT
Start: 2019-07-26 | End: 2019-08-02

## 2019-07-26 ASSESSMENT — PAIN DESCRIPTION - ONSET: ONSET: ON-GOING

## 2019-07-26 ASSESSMENT — PAIN DESCRIPTION - ORIENTATION: ORIENTATION: LEFT

## 2019-07-26 ASSESSMENT — PAIN DESCRIPTION - DESCRIPTORS: DESCRIPTORS: BURNING

## 2019-07-26 ASSESSMENT — PAIN DESCRIPTION - PAIN TYPE: TYPE: ACUTE PAIN

## 2019-07-26 ASSESSMENT — PAIN DESCRIPTION - LOCATION: LOCATION: HAND

## 2019-07-26 ASSESSMENT — PAIN SCALES - GENERAL: PAINLEVEL_OUTOF10: 6

## 2019-07-26 NOTE — LETTER
[facility]  ED  [address] [address]  Phone: [phone]  Fax: [phone]             July 26, 2019    Patient: Darby Phillips   YOB: 1985   Date of Visit: 7/26/2019       To Whom It May Concern:    Darby Phillips was seen and treated in our emergency department on 7/26/2019.  She may return to work on 7/28/19      Sincerely,             Signature:__________________________________

## 2019-07-26 NOTE — DISCHARGE INSTR - COC
transferring to Rehab): {Prognosis:4285907162}    Recommended Labs or Other Treatments After Discharge: ***    Physician Certification: I certify the above information and transfer of Flo Dockery  is necessary for the continuing treatment of the diagnosis listed and that she requires {Admit to Appropriate Level of Care:78326} for {GREATER/LESS:460742617} 30 days.      Update Admission H&P: {CHP DME Changes in AJFU:673585910}    PHYSICIAN SIGNATURE:  {Esignature:[id number]}

## 2019-07-28 ASSESSMENT — ENCOUNTER SYMPTOMS
VOMITING: 0
COLOR CHANGE: 0
COUGH: 0
BACK PAIN: 0
SHORTNESS OF BREATH: 0
ABDOMINAL PAIN: 0
NAUSEA: 0
DIARRHEA: 0

## 2019-09-05 DIAGNOSIS — J44.9 CHRONIC OBSTRUCTIVE PULMONARY DISEASE, UNSPECIFIED COPD TYPE (HCC): ICD-10-CM

## 2019-09-05 RX ORDER — TIOTROPIUM BROMIDE INHALATION SPRAY 1.56 UG/1
SPRAY, METERED RESPIRATORY (INHALATION)
Qty: 4 G | Refills: 1 | Status: SHIPPED | OUTPATIENT
Start: 2019-09-05

## 2019-09-13 ENCOUNTER — TELEPHONE (OUTPATIENT)
Dept: FAMILY MEDICINE CLINIC | Age: 34
End: 2019-09-13

## 2020-05-12 ENCOUNTER — NURSE TRIAGE (OUTPATIENT)
Dept: OTHER | Facility: CLINIC | Age: 35
End: 2020-05-12

## 2021-09-11 ENCOUNTER — APPOINTMENT (OUTPATIENT)
Dept: GENERAL RADIOLOGY | Age: 36
End: 2021-09-11
Payer: COMMERCIAL

## 2021-09-11 ENCOUNTER — HOSPITAL ENCOUNTER (EMERGENCY)
Age: 36
Discharge: HOME | End: 2021-09-11
Payer: COMMERCIAL

## 2021-09-11 VITALS
RESPIRATION RATE: 22 BRPM | SYSTOLIC BLOOD PRESSURE: 133 MMHG | BODY MASS INDEX: 35.87 KG/M2 | HEIGHT: 61 IN | HEART RATE: 69 BPM | TEMPERATURE: 98.6 F | WEIGHT: 190 LBS | DIASTOLIC BLOOD PRESSURE: 80 MMHG | OXYGEN SATURATION: 97 %

## 2021-09-11 DIAGNOSIS — J44.1 COPD EXACERBATION (HCC): Primary | ICD-10-CM

## 2021-09-11 DIAGNOSIS — Z71.6 ENCOUNTER FOR TOBACCO USE CESSATION COUNSELING: ICD-10-CM

## 2021-09-11 LAB
A/G RATIO: 1.3 (ref 1.1–2.2)
ALBUMIN SERPL-MCNC: 4.1 G/DL (ref 3.4–5)
ALP BLD-CCNC: 85 U/L (ref 40–129)
ALT SERPL-CCNC: 16 U/L (ref 10–40)
ANION GAP SERPL CALCULATED.3IONS-SCNC: 12 MMOL/L (ref 3–16)
AST SERPL-CCNC: 25 U/L (ref 15–37)
BASOPHILS ABSOLUTE: 0.3 K/UL (ref 0–0.2)
BASOPHILS RELATIVE PERCENT: 4.7 %
BILIRUB SERPL-MCNC: <0.2 MG/DL (ref 0–1)
BUN BLDV-MCNC: 13 MG/DL (ref 7–20)
CALCIUM SERPL-MCNC: 8.9 MG/DL (ref 8.3–10.6)
CHLORIDE BLD-SCNC: 103 MMOL/L (ref 99–110)
CO2: 24 MMOL/L (ref 21–32)
CREAT SERPL-MCNC: 0.7 MG/DL (ref 0.6–1.1)
EOSINOPHILS ABSOLUTE: 0.1 K/UL (ref 0–0.6)
EOSINOPHILS RELATIVE PERCENT: 2.2 %
GFR AFRICAN AMERICAN: >60
GFR NON-AFRICAN AMERICAN: >60
GLOBULIN: 3.2 G/DL
GLUCOSE BLD-MCNC: 104 MG/DL (ref 70–99)
HCT VFR BLD CALC: 35.4 % (ref 36–48)
HEMOGLOBIN: 12.1 G/DL (ref 12–16)
LYMPHOCYTES ABSOLUTE: 1.3 K/UL (ref 1–5.1)
LYMPHOCYTES RELATIVE PERCENT: 23.4 %
MCH RBC QN AUTO: 33.2 PG (ref 26–34)
MCHC RBC AUTO-ENTMCNC: 34.3 G/DL (ref 31–36)
MCV RBC AUTO: 96.8 FL (ref 80–100)
MONOCYTES ABSOLUTE: 0.2 K/UL (ref 0–1.3)
MONOCYTES RELATIVE PERCENT: 3.9 %
NEUTROPHILS ABSOLUTE: 3.7 K/UL (ref 1.7–7.7)
NEUTROPHILS RELATIVE PERCENT: 65.8 %
PDW BLD-RTO: 13.4 % (ref 12.4–15.4)
PLATELET # BLD: 236 K/UL (ref 135–450)
PMV BLD AUTO: 8.1 FL (ref 5–10.5)
POTASSIUM SERPL-SCNC: 4.3 MMOL/L (ref 3.5–5.1)
RBC # BLD: 3.65 M/UL (ref 4–5.2)
SODIUM BLD-SCNC: 139 MMOL/L (ref 136–145)
TOTAL PROTEIN: 7.3 G/DL (ref 6.4–8.2)
TROPONIN: <0.01 NG/ML
WBC # BLD: 5.6 K/UL (ref 4–11)

## 2021-09-11 PROCEDURE — 84484 ASSAY OF TROPONIN QUANT: CPT

## 2021-09-11 PROCEDURE — 99284 EMERGENCY DEPT VISIT MOD MDM: CPT

## 2021-09-11 PROCEDURE — 93005 ELECTROCARDIOGRAM TRACING: CPT | Performed by: PHYSICIAN ASSISTANT

## 2021-09-11 PROCEDURE — 80053 COMPREHEN METABOLIC PANEL: CPT

## 2021-09-11 PROCEDURE — 71045 X-RAY EXAM CHEST 1 VIEW: CPT

## 2021-09-11 PROCEDURE — 96374 THER/PROPH/DIAG INJ IV PUSH: CPT

## 2021-09-11 PROCEDURE — 85025 COMPLETE CBC W/AUTO DIFF WBC: CPT

## 2021-09-11 PROCEDURE — 6360000002 HC RX W HCPCS: Performed by: PHYSICIAN ASSISTANT

## 2021-09-11 PROCEDURE — 6370000000 HC RX 637 (ALT 250 FOR IP): Performed by: PHYSICIAN ASSISTANT

## 2021-09-11 RX ORDER — PREDNISONE 20 MG/1
40 TABLET ORAL DAILY
Qty: 10 TABLET | Refills: 0 | Status: SHIPPED | OUTPATIENT
Start: 2021-09-11 | End: 2021-09-16

## 2021-09-11 RX ORDER — METHYLPREDNISOLONE SODIUM SUCCINATE 125 MG/2ML
125 INJECTION, POWDER, LYOPHILIZED, FOR SOLUTION INTRAMUSCULAR; INTRAVENOUS ONCE
Status: COMPLETED | OUTPATIENT
Start: 2021-09-11 | End: 2021-09-11

## 2021-09-11 RX ORDER — IPRATROPIUM BROMIDE AND ALBUTEROL SULFATE 2.5; .5 MG/3ML; MG/3ML
1 SOLUTION RESPIRATORY (INHALATION)
Status: DISCONTINUED | OUTPATIENT
Start: 2021-09-11 | End: 2021-09-11

## 2021-09-11 RX ORDER — ACETAMINOPHEN 500 MG
1000 TABLET ORAL ONCE
Status: COMPLETED | OUTPATIENT
Start: 2021-09-11 | End: 2021-09-11

## 2021-09-11 RX ORDER — AZITHROMYCIN 250 MG/1
250 TABLET, FILM COATED ORAL SEE ADMIN INSTRUCTIONS
Qty: 6 TABLET | Refills: 0 | Status: SHIPPED | OUTPATIENT
Start: 2021-09-11 | End: 2021-09-16

## 2021-09-11 RX ADMIN — METHYLPREDNISOLONE SODIUM SUCCINATE 125 MG: 125 INJECTION, POWDER, FOR SOLUTION INTRAMUSCULAR; INTRAVENOUS at 18:22

## 2021-09-11 RX ADMIN — ACETAMINOPHEN 1000 MG: 500 TABLET ORAL at 18:59

## 2021-09-11 ASSESSMENT — PAIN DESCRIPTION - PAIN TYPE: TYPE: ACUTE PAIN

## 2021-09-11 ASSESSMENT — PAIN SCALES - GENERAL
PAINLEVEL_OUTOF10: 6
PAINLEVEL_OUTOF10: 8

## 2021-09-11 NOTE — ED NOTES
[id number] Mary Lilly for d/c. Stable and ambulatory.       [facility] JULIAN Myrick  09/11/21 5856

## 2021-09-11 NOTE — ED PROVIDER NOTES
[facility] Emergency Department    CHIEF COMPLAINT  Shortness of Breath (Hx of COPD, negative covid test yesterday. reports SpO2 was low 80's at home. No home O2 use. Pt's SpO2 96% during ambulation to room.)      SHARED SERVICE VISIT  Evaluated by ROBERTA. My supervising physician was available for consultation. HISTORY OF PRESENT ILLNESS  Ion Rene is a 39 y.o. female history of COPD, current tobacco use presents emergency department for evaluation of worsening shortness of breath and a cough. Patient states over the past few days she has noticed that her oxygen on her home pulse oximetry device was reading low in the 80s. Patient does not use oxygen. Patient currently smokes 1 pack/day. She went and had a negative COVID-19 test yesterday. Denies any chest pain. She reports an increase in her wheezing. She has been using both her albuterol daily inhaler more frequently and also more than recommended. She has plans to get her pulmonary function test evaluated by her primary care shortly. Fevers or chills. No other complaints, modifying factors or associated symptoms. Nursing notes reviewed. Past Medical History:   Diagnosis Date    Adhesions; postoperative     post umbilical hernia.     Chronic abdominal pain     Hernia of unspecified site of abdominal cavity without mention of obstruction or gangrene     Narcotic abuse ([address]     Substance abuse ([address]      Past Surgical History:   Procedure Laterality Date     SECTION      CHOLECYSTECTOMY  [id number]    HERNIA REPAIR      HERNIA REPAIR      HYSTERECTOMY      HYSTERECTOMY, TOTAL ABDOMINAL      PELVIC LAPAROSCOPY       Family History   Problem Relation Age of Onset    Diabetes Mother     Cancer Father      Social History     Socioeconomic History    Marital status:      Spouse name: Not on file    Number of children: Not on file    Years of education: Not on file    Highest mouth See Admin Instructions for 5 days 500mg on day 1 followed by 250mg on days 2 - 5 6 tablet 0    albuterol sulfate  (90 Base) MCG/ACT inhaler INHALE 2 PUFFS INTO THE LUNGS EVERY 4 - 6 HOURS AS NEEDED FOR WHEEZING OR SHORTNESS OF BREATH 8.5 Inhaler 0    SPIRIVA RESPIMAT 1.25 MCG/ACT AERS inhaler INHALE 2 PUFFS INTO THE LUNGS DAILY 4 g 1    SPIRIVA RESPIMAT 1.25 MCG/ACT AERS inhaler INHALE 2 PUFFS INTO THE LUNGS DAILY 4 g 0    Biotin w/ Vitamins C & E (HAIR/SKIN/NAILS PO) Take by mouth      ipratropium-albuterol (DUONEB) 0.5-2.5 (3) MG/3ML SOLN nebulizer solution Take 1 vial by nebulization every 4 hours      Buprenorphine HCl-Naloxone HCl (SUBOXONE) 8-2 MG FILM Place 1 Film under the tongue 2 times daily. Allergies   Allergen Reactions    Penicillins Anaphylaxis    Cephalexin Hives    Tramadol Hives       REVIEW OF SYSTEMS  10 systems reviewed, pertinent positives per HPI otherwise noted to be negative    PHYSICAL EXAM  /87   Pulse 84   Temp 98.6 °F (37 °C) (Oral)   Resp 18   Ht 5' 1\" (1.549 m)   Wt 190 lb (86.2 kg)   SpO2 99%   BMI 35.90 kg/m²   GENERAL APPEARANCE: Awake and alert. Cooperative. Adrian Jameson HEAD: Normocephalic. Atraumatic. EYES: PERRL. EOM's grossly intact. ENT: Mucous membranes are moist.   NECK: Supple. HEART: RRR. No murmurs. LUNGS: Respirations mildly labored with increased wheeze and rhonchi on the right compared to the left. Able to speak in full sentences. ABDOMEN: Soft. Non-distended. Non-tender. No guarding or rebound. No masses. No organomegaly. EXTREMITIES: No peripheral edema. Moves all extremities equally. All extremities neurovascularly intact. SKIN: Warm and dry. No acute rashes. NEUROLOGICAL: Alert and oriented. CN's 2-12 intact. No gross facial drooping. Strength 5/5, sensation intact. PSYCHIATRIC: Normal mood and affect. RADIOLOGY  XR CHEST PORTABLE   Final Result   No acute process.                LABS  Labs Reviewed   CBC WITH AUTO DIFFERENTIAL - Abnormal; Notable for the following components:       Result Value    RBC 3.65 (*)     Hematocrit 35.4 (*)     Basophils Absolute 0.3 (*)     All other components within normal limits    Narrative:     Performed at:  [place] [address] [address]   Phone [phone]   COMPREHENSIVE METABOLIC PANEL - Abnormal; Notable for the following components:    Glucose 104 (*)     All other components within normal limits    Narrative:     Performed at:  [place] [address] [address]   Phone [phone]   TROPONIN    Narrative:     Performed at:  [place] [address] [address]   Phone [phone]       PROCEDURES  Unless otherwise noted below, none  Procedures        MDM  MDM  79-year-old female history COPD secondary to tobacco use presents emergency for evaluation of shortness of breath cough and wheeze. On arrival to emergency department patient's vitals were within normal limits. She was ambulated in the emergency department and her oxygen saturation remained at 96% on room air. Patient was reporting at home readings in the low 80s however at this time patient is placed on continuous pulse oximetry and is satting at 99%. Recommend that patient have her pulse oximetry device calibrated or purchase a new one for comparison. On exam patient does have some rhonchi and wheezing on the right as compared to left. Suspicious for acute COPD exacerbation. We will treat patient accordingly with 125 of Solu-Medrol and DuoNeb. Anticipate patient will be safely discharged home with a course of azithromycin. Basic lab work including CBC CMP and a troponin were obtained. EKG and chest x-ray were also obtained. Chest x-ray demonstrated no acute disease.   Will reevaluate patient following treatment for anticipation of discharge home to follow-up with her primary care provider and/or pulmonology. Discussed with patient plans and strategies for smoking cessation. 5 minutes was spent discussing nicotine replacement therapy as well as medication assisted placement therapy with medications follow-up with such as Wellbutrin and Chantix. Discussed use of patches versus nicotine gum. Elected to provide patient with a prescription for 2 mg nicotine gum in order to assist in cessation. Patient to follow-up with primary care provider as well as pulmonology for further evaluation. On patient after the steroid imparted the DuoNeb patient did have great improvement in her breathing. Patient declined Maxine Beatriz stating that she has breathing treatments at home that she feels much better.   Degree of this is reasonable given her reassuring oxygen saturations and improvement in respiratory exam.    Results for orders placed or performed during the hospital encounter of 09/11/21   CBC auto differential   Result Value Ref Range    WBC 5.6 4.0 - 11.0 K/uL    RBC 3.65 (L) 4.00 - 5.20 M/uL    Hemoglobin 12.1 12.0 - 16.0 g/dL    Hematocrit 35.4 (L) 36.0 - 48.0 %    MCV 96.8 80.0 - 100.0 fL    MCH 33.2 26.0 - 34.0 pg    MCHC 34.3 31.0 - 36.0 g/dL    RDW 13.4 12.4 - 15.4 %    Platelets 102 074 - 283 K/uL    MPV 8.1 5.0 - 10.5 fL    Neutrophils % 65.8 %    Lymphocytes % 23.4 %    Monocytes % 3.9 %    Eosinophils % 2.2 %    Basophils % 4.7 %    Neutrophils Absolute 3.7 1.7 - 7.7 K/uL    Lymphocytes Absolute 1.3 1.0 - 5.1 K/uL    Monocytes Absolute 0.2 0.0 - 1.3 K/uL    Eosinophils Absolute 0.1 0.0 - 0.6 K/uL    Basophils Absolute 0.3 (H) 0.0 - 0.2 K/uL   Comprehensive metabolic panel   Result Value Ref Range    Sodium 139 136 - 145 mmol/L    Potassium 4.3 3.5 - 5.1 mmol/L    Chloride 103 99 - 110 mmol/L    CO2 24 21 - 32 mmol/L    Anion Gap 12 3 - 16    Glucose 104 (H) 70 - 99 mg/dL    BUN 13 7 - 20 mg/dL    CREATININE 0.7 0.6 - 1.1 mg/dL    GFR Non-African American >60 >60 GFR African American >60 >60    Calcium 8.9 8.3 - 10.6 mg/dL    Total Protein 7.3 6.4 - 8.2 g/dL    Albumin 4.1 3.4 - 5.0 g/dL    Albumin/Globulin Ratio 1.3 1.1 - 2.2    Total Bilirubin <0.2 0.0 - 1.0 mg/dL    Alkaline Phosphatase 85 40 - 129 U/L    ALT 16 10 - 40 U/L    AST 25 15 - 37 U/L    Globulin 3.2 g/dL   Troponin   Result Value Ref Range    Troponin <0.01 <0.01 ng/mL   EKG 12 Lead   Result Value Ref Range    Ventricular Rate 68 BPM    Atrial Rate 68 BPM    P-R Interval 144 ms    QRS Duration 84 ms    Q-T Interval 406 ms    QTc Calculation (Bazett) 431 ms    P Axis 41 degrees    R Axis 51 degrees    T Axis 48 degrees    Diagnosis       Normal sinus rhythmSeptal infarct , age undeterminedAbnormal ECGNo previous ECGs available       I estimate there is LOW risk for PULMONARY EMBOLISM, ACUTE CORONARY SYNDROME, OR THORACIC AORTIC DISSECTION, thus I consider the discharge disposition reasonable. Dorothye Cowden and I have discussed the diagnosis and risks, and we agree with discharging home to follow-up with their primary doctor. We also discussed returning to the Emergency Department immediately if new or worsening symptoms occur. We have discussed the symptoms which are most concerning (e.g., bloody sputum, fever, worsening pain or shortness of breath, vomiting) that necessitate immediate return. Blood pressure 139/87, pulse 84, temperature 98.6 °F (37 °C), temperature source Oral, resp. rate 18, height 5' 1\" (1.549 m), weight 190 lb (86.2 kg), SpO2 99 %, not currently breastfeeding. DISPOSITION  Patient was discharged to home in good condition. CLINICAL IMPRESSION  1. COPD exacerbation ([address]    2.  Encounter for tobacco use cessation counseling           Luciano Lane PA-C  09/11/21 1921

## 2021-09-12 LAB
EKG ATRIAL RATE: 68 BPM
EKG DIAGNOSIS: NORMAL
EKG P AXIS: 41 DEGREES
EKG P-R INTERVAL: 144 MS
EKG Q-T INTERVAL: 406 MS
EKG QRS DURATION: 84 MS
EKG QTC CALCULATION (BAZETT): 431 MS
EKG R AXIS: 51 DEGREES
EKG T AXIS: 48 DEGREES
EKG VENTRICULAR RATE: 68 BPM

## 2021-09-12 PROCEDURE — 93010 ELECTROCARDIOGRAM REPORT: CPT | Performed by: INTERNAL MEDICINE

## 2021-10-29 ENCOUNTER — HOSPITAL ENCOUNTER (EMERGENCY)
Age: 36
Discharge: HOME | End: 2021-10-29
Attending: STUDENT IN AN ORGANIZED HEALTH CARE EDUCATION/TRAINING PROGRAM
Payer: COMMERCIAL

## 2021-10-29 ENCOUNTER — APPOINTMENT (OUTPATIENT)
Dept: GENERAL RADIOLOGY | Age: 36
End: 2021-10-29
Payer: COMMERCIAL

## 2021-10-29 VITALS
OXYGEN SATURATION: 97 % | HEART RATE: 77 BPM | TEMPERATURE: 98 F | RESPIRATION RATE: 16 BRPM | SYSTOLIC BLOOD PRESSURE: 141 MMHG | DIASTOLIC BLOOD PRESSURE: 73 MMHG

## 2021-10-29 DIAGNOSIS — R10.2 PELVIC PAIN: ICD-10-CM
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
## 2021-10-29 LAB
EKG ATRIAL RATE: 73 BPM
EKG DIAGNOSIS: NORMAL
EKG P AXIS: 54 DEGREES
EKG P-R INTERVAL: 160 MS
EKG Q-T INTERVAL: 384 MS
EKG QRS DURATION: 84 MS
EKG QTC CALCULATION (BAZETT): 423 MS
EKG R AXIS: 57 DEGREES
EKG T AXIS: 53 DEGREES
EKG VENTRICULAR RATE: 73 BPM

## 2021-10-29 PROCEDURE — 93005 ELECTROCARDIOGRAM TRACING: CPT | Performed by: STUDENT IN AN ORGANIZED HEALTH CARE EDUCATION/TRAINING PROGRAM

## 2021-10-29 PROCEDURE — 99284 EMERGENCY DEPT VISIT MOD MDM: CPT

## 2021-10-29 PROCEDURE — 6370000000 HC RX 637 (ALT 250 FOR IP): Performed by: STUDENT IN AN ORGANIZED HEALTH CARE EDUCATION/TRAINING PROGRAM

## 2021-10-29 PROCEDURE — 71046 X-RAY EXAM CHEST 2 VIEWS: CPT

## 2021-10-29 PROCEDURE — 93010 ELECTROCARDIOGRAM REPORT: CPT | Performed by: INTERNAL MEDICINE

## 2021-10-29 RX ORDER — ONDANSETRON 4 MG/1
4 TABLET, ORALLY DISINTEGRATING ORAL ONCE
Status: COMPLETED | OUTPATIENT
Start: 2021-10-29 | End: 2021-10-29

## 2021-10-29 RX ADMIN — ONDANSETRON 4 MG: 4 TABLET, ORALLY DISINTEGRATING ORAL at 08:22

## 2021-10-29 ASSESSMENT — ENCOUNTER SYMPTOMS
CHEST TIGHTNESS: 1
COUGH: 0
NAUSEA: 1
SHORTNESS OF BREATH: 0
BACK PAIN: 0
STRIDOR: 0
VOMITING: 0
PHOTOPHOBIA: 0
ABDOMINAL PAIN: 0

## 2021-10-29 ASSESSMENT — PAIN SCALES - GENERAL
PAINLEVEL_OUTOF10: 4
PAINLEVEL_OUTOF10: 4

## 2021-10-29 NOTE — ED NOTES
Bed: 03  Expected date:   Expected time:   Means of arrival:   Comments:  Diana Alvarado  10/29/21 0805
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
## 2023-02-04 ENCOUNTER — APPOINTMENT (OUTPATIENT)
Dept: GENERAL RADIOLOGY | Age: 38
End: 2023-02-04
Payer: COMMERCIAL

## 2023-02-04 ENCOUNTER — HOSPITAL ENCOUNTER (EMERGENCY)
Age: 38
Discharge: HOME | End: 2023-02-04
Attending: EMERGENCY MEDICINE
Payer: COMMERCIAL

## 2023-02-04 VITALS
DIASTOLIC BLOOD PRESSURE: 99 MMHG | HEART RATE: 88 BPM | SYSTOLIC BLOOD PRESSURE: 125 MMHG | OXYGEN SATURATION: 98 % | TEMPERATURE: 97.5 F | BODY MASS INDEX: 37.3 KG/M2 | RESPIRATION RATE: 20 BRPM | HEIGHT: 60 IN | WEIGHT: 190 LBS

## 2023-02-04 DIAGNOSIS — S42.022A CLOSED DISPLACED FRACTURE OF SHAFT OF LEFT CLAVICLE, INITIAL ENCOUNTER: Primary | ICD-10-CM

## 2023-02-04 PROCEDURE — 99283 EMERGENCY DEPT VISIT LOW MDM: CPT

## 2023-02-04 PROCEDURE — 6370000000 HC RX 637 (ALT 250 FOR IP): Performed by: EMERGENCY MEDICINE

## 2023-02-04 PROCEDURE — 73030 X-RAY EXAM OF SHOULDER: CPT

## 2023-02-04 RX ORDER — IBUPROFEN 600 MG/1
600 TABLET ORAL ONCE
Status: COMPLETED | OUTPATIENT
Start: 2023-02-04 | End: 2023-02-04

## 2023-02-04 RX ADMIN — IBUPROFEN 600 MG: 600 TABLET, FILM COATED ORAL at 06:32

## 2023-02-04 ASSESSMENT — PAIN DESCRIPTION - ORIENTATION
ORIENTATION: LEFT
ORIENTATION: LEFT

## 2023-02-04 ASSESSMENT — PAIN DESCRIPTION - PAIN TYPE: TYPE: ACUTE PAIN

## 2023-02-04 ASSESSMENT — PAIN SCALES - GENERAL
PAINLEVEL_OUTOF10: 7
PAINLEVEL_OUTOF10: 9

## 2023-02-04 ASSESSMENT — PAIN DESCRIPTION - LOCATION
LOCATION: SHOULDER
LOCATION: SHOULDER

## 2023-02-04 NOTE — ED PROVIDER NOTES
[facility]      CHIEF COMPLAINT  Fall (Pt states she was sitting on bed, playing on her phone, and fell out of the bed onto the left side. Hit something but not sure what was it. C/o of pain from the neck down to her left shoulder worse on movement.)      HISTORY OF PRESENT ILLNESS  Braulio Hicks is a 44 y.o. female with a history of chronic abdominal pain presents with left shoulder pain. She states she was in bed playing on her phone and must have dozed off to sleep and rolled out of her bed. She is not sure what she hit but there is a little mini fridge next to her bed. She thinks she hit her shoulder directly on that. She is having a lot of pain in her left clavicle and shoulder. She did not lose consciousness with the fall. She is not short of breath. The pain is radiating up the left side of the neck but she is not having posterior neck pain. No weakness or numbness of the left arm. .   No other complaints, modifying factors or associated symptoms. History obtained from the patient. I have reviewed the following from the nursing documentation. Past Medical History:   Diagnosis Date    Adhesions; postoperative     post umbilical hernia.     Chronic abdominal pain     Hernia of unspecified site of abdominal cavity without mention of obstruction or gangrene     Narcotic abuse ([address]     Substance abuse ([address]      Past Surgical History:   Procedure Laterality Date     SECTION      CHOLECYSTECTOMY  [id number]    HERNIA REPAIR      HERNIA REPAIR      HYSTERECTOMY      PELVIC LAPAROSCOPY  2009    TOTAL ABDOMINAL HYSTERECTOMY       Family History   Problem Relation Age of Onset    Diabetes Mother     Cancer Father      Social History     Socioeconomic History    Marital status:      Spouse name: Not on file    Number of children: Not on file    Years of education: Not on file    Highest education level: Not on file   Occupational History    Not on file Tobacco Use    Smoking status: Every Day     Packs/day: 1.00     Years: 21.00     Pack years: 21.00     Types: Cigarettes    Smokeless tobacco: Never   Vaping Use    Vaping Use: Never used   Substance and Sexual Activity    Alcohol use: No    Drug use: Not Currently     Comment: narcotics    Sexual activity: Yes     Partners: Male   Other Topics Concern    Not on file   Social History Narrative    Not on file     Social Determinants of Health     Financial Resource Strain: Not on file   Food Insecurity: Not on file   Transportation Needs: Not on file   Physical Activity: Not on file   Stress: Not on file   Social Connections: Not on file   Intimate Partner Violence: Not on file   Housing Stability: Not on file     No current facility-administered medications for this encounter. Current Outpatient Medications   Medication Sig Dispense Refill    nicotine polacrilex (NICORETTE) 2 MG gum Take 1 each by mouth as needed for Smoking cessation 110 each 3    albuterol sulfate  (90 Base) MCG/ACT inhaler INHALE 2 PUFFS INTO THE LUNGS EVERY 4 - 6 HOURS AS NEEDED FOR WHEEZING OR SHORTNESS OF BREATH 8.5 Inhaler 0    SPIRIVA RESPIMAT 1.25 MCG/ACT AERS inhaler INHALE 2 PUFFS INTO THE LUNGS DAILY 4 g 1    SPIRIVA RESPIMAT 1.25 MCG/ACT AERS inhaler INHALE 2 PUFFS INTO THE LUNGS DAILY 4 g 0    Biotin w/ Vitamins C & E (HAIR/SKIN/NAILS PO) Take by mouth      ipratropium-albuterol (DUONEB) 0.5-2.5 (3) MG/3ML SOLN nebulizer solution Take 1 vial by nebulization every 4 hours      buprenorphine-naloxone (SUBOXONE) 8-2 MG FILM SL film Place 1 Film under the tongue 2 times daily.  (Patient not taking: Reported on 2/4/2023)       Allergies   Allergen Reactions    Penicillins Anaphylaxis    Cephalexin Hives    Tramadol Hives       REVIEW OF SYSTEMS    Unless otherwise stated in this report, this patient's positive and negative responses for review of systems (constitutional, eyes, ENT, cardiovascular, respiratory, gastrointestinal, neurological, genitourinary, musculoskeletal, integument systems and systems related to the presenting problem) are either stated in the preceding paragraph, were not pertinent or were negative for the symptoms and/or complaints related to the medical problem. PHYSICAL EXAM  BP (!) 145/91   Pulse 71   Temp 98 °F (36.7 °C)   Resp 20   Ht 5' (1.524 m)   Wt 190 lb (86.2 kg)   SpO2 100%   BMI 37.11 kg/m²   GENERAL APPEARANCE: Awake and alert. Cooperative. No acute distress. HEAD: Normocephalic. Atraumatic. EYES: PERRL. EOM's grossly intact. ENT: Mucous membranes are moist.   NECK: Supple, trachea midline. HEART: RRR. LUNGS: Respirations unlabored. Speaking comfortably in full sentences. ABDOMEN:  Non-distended. EXTREMITIES: Painful range of motion of the left shoulder. Most of the tenderness is over the left mid clavicle. No clear deformity noted. The left arm is neurovascularly intact. No C-spine tenderness. SKIN: Warm, dry and intact. No acute rashes. NEUROLOGICAL: Alert and oriented X 3. CN II-XII grossly intact. Strength 5/5, sensation intact. PSYCHIATRIC: Normal mood and affect. LABS  I have reviewed all labs for this visit. No results found for this visit on 02/04/23. RADIOLOGY  X-RAYS: ALL IMAGES INCLUDING PLAIN FILMS, CT, ULTRASOUND AND MRI HAVE BEEN READ BY THE RADIOLOGIST. XR SHOULDER LEFT (MIN 2 VIEWS)   Final Result   Displaced mildly comminuted midshaft fracture of the clavicle. I have personally reviewed Xray and Ultrasound images and radiology confirms the interpretation:  Left shoulder x-ray showing a displaced midshaft left clavicle fracture. Medications administered. (Dose and Route): Motrin 600 mg by mouth. Sepsis:  Is this patient to be included in the SEP-1 Core Measure due to severe sepsis or septic shock? No   Exclusion criteria - the patient is NOT to be included for SEP-1 Core Measure due to:   Infection is not suspected             ED COURSE/MDM:  Patient seen and evaluated. Here the patient is afebrile with normal vitals signs. Old records reviewed: No prior records reviewed. Diagnoses affirmatively addressed, consideration of tests, treatments & admission, including shared decision making:    Here the patient is tearful and appears to be in pain. Most of her tenderness is over the left clavicle. She has painful range of motion of the left shoulder but no deformity. I do not suspect shoulder dislocation based on her exam.  The left arm is neurovascularly intact. She is having some pain radiating to the left lateral neck but she has no midline C-spine tenderness. She does have some bruising over the left neck but she states this is a \"hickey\" and not from the fall. I have ordered a left shoulder x-ray which is pending and I have ordered Motrin for her for pain. X-ray shows displaced midshaft left clavicle fracture. Again the patient's arm is neurovascularly intact. I will place her in a sling and refer her to orthopedic surgery for follow-up. I will recommend NSAIDs at home. She does have a history of narcotic abuse therefore I do not feel comfortable prescribing any opiates for her. Consultation summary: None. Social determinants of health: None. Labs and imaging reviewed and results discussed with patient. Patient was reassessed as noted above . Plan of care discussed with patient. Patient in agreement with plan. Strict return precautions have been given. Patient was given scripts for the following medications. I counseled patient how to take these medications. New Prescriptions    No medications on file       None. CLINICAL IMPRESSION  1. Closed displaced fracture of shaft of left clavicle, initial encounter        Blood pressure (!) 145/91, pulse 71, temperature 98 °F (36.7 °C), resp.  rate 20, height 5' (1.524 m), weight 190 lb (86.2 kg), SpO2 100 %, not currently breastfeeding. DISPOSITION  Judeth Denver was discharged to home none. in stable condition. Aditya Rajput MD am the primary clinician of record.     (Please note this note was completed with a voice recognition program.  Efforts were made to edit the dictations but occasionally words are mis-transcribed.)        Rebel Harvey MD  02/04/23 [id number]

## 2023-02-06 ENCOUNTER — TELEPHONE (OUTPATIENT)
Dept: ORTHOPEDIC SURGERY | Age: 38
End: 2023-02-06

## 2023-02-06 ENCOUNTER — OFFICE VISIT (OUTPATIENT)
Dept: ORTHOPEDIC SURGERY | Age: 38
End: 2023-02-06

## 2023-02-06 VITALS — HEIGHT: 60 IN | WEIGHT: 190 LBS | BODY MASS INDEX: 37.3 KG/M2

## 2023-02-06 DIAGNOSIS — M25.512 ACUTE PAIN OF LEFT SHOULDER: Primary | ICD-10-CM

## 2023-02-06 DIAGNOSIS — S42.022A CLOSED DISPLACED FRACTURE OF SHAFT OF LEFT CLAVICLE, INITIAL ENCOUNTER: ICD-10-CM

## 2023-02-06 RX ORDER — HYDROCODONE BITARTRATE AND ACETAMINOPHEN 5; 325 MG/1; MG/1
1 TABLET ORAL EVERY 6 HOURS PRN
Qty: 28 TABLET | Refills: 0 | Status: SHIPPED | OUTPATIENT
Start: 2023-02-06 | End: 2023-02-06 | Stop reason: CLARIF

## 2023-02-06 RX ORDER — OXYCODONE HYDROCHLORIDE AND ACETAMINOPHEN 5; 325 MG/1; MG/1
1 TABLET ORAL EVERY 6 HOURS PRN
Qty: 28 TABLET | Refills: 0 | Status: SHIPPED | OUTPATIENT
Start: 2023-02-06 | End: 2023-02-13

## 2023-02-06 RX ORDER — DICLOFENAC SODIUM 75 MG/1
75 TABLET, DELAYED RELEASE ORAL 2 TIMES DAILY
Qty: 60 TABLET | Refills: 3 | Status: SHIPPED | OUTPATIENT
Start: 2023-02-06

## 2023-02-06 NOTE — PROGRESS NOTES
Chief Complaint    Shoulder Pain (NP LEFT COLLARBONE)    Initial evaluation left clavicular pain status post fall 2/4/2023 with left clavicular deformity    History of Present Illness:  Vicki Thomas is a 44 y.o. 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 Apparently on 2/4/2023, she was sitting in bed using her phone and inadvertently fell out of bed striking this shoulder and clavicle against a mini fridge which was on the floor. She is uncertain as to whether or not there is a pop or crack but did have immediate pain and did have an obvious deformity to the left clavicle. She did try to ice but immediately was seen in the emergency room where x-rays did reveal a displaced angulated left central third clavicular fracture with butterfly fragment. She was not given any type of pain medications and was told to take Advil and Tylenol and was placed in a fairly poor fitting sling. She had previously been on Suboxone which was like the reason the emergency room did not give her any pain medications but obviously she does have quite a bit of discomfort with very limited shoulder motion at this point. She has been managed in pain management with Suboxone for quite some time. She has been attempting ice. Denies upper extremity radicular symptoms or substantial shortness of breath although even coughing is very painful for her. Previous history of shoulder injury. She is being seen today for urgent consultation and evaluation.       Pain Assessment  Location of Pain: Shoulder  Location Modifiers: Left  Severity of Pain: 10  Quality of Pain: Sharp, Throbbing  Duration of Pain: Persistent  Frequency of Pain: Constant  Aggravating Factors: Stretching, Straightening, Bending  Limiting Behavior: Yes  Relieving Factors: Rest  Result of Injury: Yes  Work-Related Injury: No  Are there other pain locations you wish to document?: No    Medical History  Patient's medications, allergies, past medical, surgical, social and family histories were reviewed and updated as appropriate. Review of Systems  Relevant review of systems reviewed on 2/6/2023 and available in the patient's chart under the medial tab. Vital Signs  There were no vitals filed for this visit. General Exam:   Constitutional: Patient is adequately groomed with no evidence of malnutrition  DTRs: Deep tendon reflexes are intact  Mental Status: The patient is oriented to time, place and person. The patient's mood and affect are appropriate. Lymphatic: The lymphatic examination bilaterally reveals all areas to be without enlargement or induration. Vascular: Examination reveals no swelling or calf tenderness. Peripheral pulses are palpable and 2+. Neurological: The patient has good coordination. There is no weakness or sensory deficit. Shoulder Examination    Inspection: She does have an obvious deformity of the central third of the left clavicle with some mild soft tissue swelling but no skin tenting. Palpation: He is obviously clinically tender over the central third of the clavicle with limited shoulder motion secondary to pain       Rang of Motion: Severely limited left shoulder pain secondary to left clavicular pain. Strength: Giveaway type weakness with left shoulder rotator cuff strength testing. Special Tests: There does not appear to be high-grade instability of the [facility] or [place] joints. Skin: There are no rashes, ulcerations or lesions. Distal motor sensory and vascular exam is intact. Gait: Fluid smooth gait. Reflexes:  Symmetrically preserved.        Additional Comments:        Additional Examinations:  Contralateral Exam: Right clavicular exam is reasonably benign  Right Upper Extremity:  Examination of the right upper extremity does not show any tenderness, deformity or injury. Range of motion is unremarkable. There is no gross instability. There are no rashes, ulcerations or lesions. Strength and tone are normal.  Left Upper Extremity: Examination of the left upper extremity does not show any tenderness, deformity or injury. Range of motion is unremarkable. There is no gross instability. There are no rashes, ulcerations or lesions. Strength and tone are normal.  Neck: Examination of the neck does not show any tenderness, deformity or injury. Range of motion is unremarkable. There is no gross instability. There are no rashes, ulcerations or lesions. Strength and tone are normal.         Diagnostic Test Findings:   Left clavicle AP and 15 degree upright does show a 100% displaced left clavicular fracture with butterfly fragment. Assessment: #1.  3 days status post fall with left shoulder contusion and displaced left central third clavicular fracture with butterfly fragment  #2. In pain management previously managed on Suboxone. Impression:    Encounter Diagnoses   Name Primary? Acute pain of left shoulder Yes    Closed displaced fracture of shaft of left clavicle, initial encounter        Office Procedures:     Orders Placed This Encounter   Procedures    Gwen Colon MD, Orthopedics and Sports Medicine (Shoulder; Hip; Knee), [facility]     Referral Priority:   Routine     Referral Type:   Eval and Treat     Referral Reason:   Specialty Services Required     Referred to Provider:   Tomas Cerna MD     Requested Specialty:   Orthopedic Surgery     Number of Visits Requested:   1    Breg Sling Shot 2 Shoulder Sling     Patient was prescribed a Breg Sling Shot II Shoulder Brace. The left shoulder will require stabilization / immobilization from this orthosis.   The orthosis will assist in protecting the affected area, provide functional support and facilitate healing. The device was ordered and fit on 2/6/23. The patient was educated and fit by a healthcare professional with expert knowledge and specialization in brace application while under the direct supervision of the treating physician. Verbal and written instructions for the use of and application of this item were provided. They were instructed to contact the office immediately should the brace result in increased pain, decreased sensation, increased swelling or worsening of the condition. Treatment Plan: Treatment options were discussed with Gaurav Hermosillo and her mother-in-law today. We did review her current plain films and exam findings. She does have a quite significant left clavicular fracture which is 100% displaced with a butterfly fragment. Clinic at this point she is quite symptomatic and would likely be best served by open reduction internal fixation of this particular fracture. We did review this briefly with Dr. Karen Bo who is kindly agreed to see her tomorrow for surgical consultation. We did place her in a more appropriately fitting UltraSling and encouraged her to ice. We started her on diclofenac 75 mg 1 pill twice daily and did give her oxycodone 5/325 every 6 hours for breakthrough pain. I would strongly recommend that she gets permission from her pain management physician to take this in lieu of her Suboxone which she may need to hold temporarily. Richard El and activity modification was discussed. 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 She has just started this job. She will have surgical consultation tomorrow on 2/7/2023 and will contact us in the interim with questions or concerns            This dictation was performed with a verbal recognition program (DRAGON) and it was checked for errors. It is possible that there are still dictated errors within this office note.  If so, please bring any errors to my attention for an addendum. All efforts were made to ensure that this office note is accurate.

## 2023-02-06 NOTE — TELEPHONE ENCOUNTER
ATTEMPTED TO CALL PATIENT. NO ANSWER AND UNABLE TO LEAVE MESSAGE. ATTEMPTED TO CALL HER , NO ANSWER AND UNABLE TO LEAVE MESSAGE. CALLED TO CONFIRM APPOINTMENT FOR HER WITH DR. LANGLEY TOMORROW AS WELL AS ADVISE HER TO NOT TAKE THE DICLOFENAC PILLS SHE WAS PRESCRIBED.

## 2023-02-07 ENCOUNTER — OFFICE VISIT (OUTPATIENT)
Dept: ORTHOPEDIC SURGERY | Age: 38
End: 2023-02-07

## 2023-02-07 VITALS — HEIGHT: 60 IN | BODY MASS INDEX: 35.34 KG/M2 | WEIGHT: 180 LBS

## 2023-02-07 DIAGNOSIS — R52 PAIN: ICD-10-CM

## 2023-02-07 DIAGNOSIS — S42.022A CLOSED DISPLACED FRACTURE OF SHAFT OF LEFT CLAVICLE, INITIAL ENCOUNTER: Primary | ICD-10-CM
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
## 2023-02-10 ENCOUNTER — TELEPHONE (OUTPATIENT)
Dept: ORTHOPEDIC SURGERY | Age: 38
End: 2023-02-10

## 2023-02-12 ENCOUNTER — CASE MANAGEMENT (OUTPATIENT)
Dept: ORTHOPEDIC SURGERY | Age: 38
End: 2023-02-12

## 2023-02-13 NOTE — PROGRESS NOTES
Patient was seen by Dr Julian Carrillo on 2/6/23 with referral to me placed and the patient was seen on 2/7/23. Please see that note for details. The patient was scheduled for surgery on 2/10/23 with routine preop hospital contact process completed as to my understanding. The hospital OR staff in AM on 2/10/23 as well as clinic staff (see documentation note by Maria Teresa Espana) attempted multiple times to contact the patient at the telephone contact info listed without the patient answering and no way to leave a VM given the patient had no showed her planned surgical procedure. The patient also did not contact the office at any point that week or Friday or attempt over the weekend to contact the after hours number to explain no show for her surgical case. Pending patient contacting the office for discussion, surgical case time to be cancelled as patient has no showed without contact. Otherwise, clinic staff to aid in care by providing patient the contact information for additional orthopaedic surgeons/resources so that she may receive care.

## 2023-09-18 ENCOUNTER — HOSPITAL ENCOUNTER (EMERGENCY)
Age: 38
Discharge: HOME | End: 2023-09-19
Attending: STUDENT IN AN ORGANIZED HEALTH CARE EDUCATION/TRAINING PROGRAM
Payer: COMMERCIAL

## 2023-09-18 DIAGNOSIS — L02.91 ABSCESS: ICD-10-CM

## 2023-09-18 DIAGNOSIS — R10.84 GENERALIZED ABDOMINAL PAIN: ICD-10-CM

## 2023-09-18 DIAGNOSIS — L03.818 CELLULITIS OF OTHER SPECIFIED SITE: Primary | ICD-10-CM

## 2023-09-18 PROCEDURE — 99285 EMERGENCY DEPT VISIT HI MDM: CPT

## 2023-09-18 PROCEDURE — 96374 THER/PROPH/DIAG INJ IV PUSH: CPT

## 2023-09-19 ENCOUNTER — APPOINTMENT (OUTPATIENT)
Dept: CT IMAGING | Age: 38
End: 2023-09-19
Payer: COMMERCIAL

## 2023-09-19 VITALS
HEIGHT: 60 IN | TEMPERATURE: 98.1 F | OXYGEN SATURATION: 100 % | HEART RATE: 82 BPM | BODY MASS INDEX: 43.19 KG/M2 | SYSTOLIC BLOOD PRESSURE: 166 MMHG | WEIGHT: 220 LBS | DIASTOLIC BLOOD PRESSURE: 97 MMHG | RESPIRATION RATE: 18 BRPM

## 2023-09-19 LAB
ALBUMIN SERPL-MCNC: 4.1 G/DL (ref 3.4–5)
ALBUMIN/GLOB SERPL: 1.6 {RATIO} (ref 1.1–2.2)
ALP SERPL-CCNC: 86 U/L (ref 40–129)
ALT SERPL-CCNC: 24 U/L (ref 10–40)
ANION GAP SERPL CALCULATED.3IONS-SCNC: 8 MMOL/L (ref 3–16)
AST SERPL-CCNC: 14 U/L (ref 15–37)
BASOPHILS # BLD: 0 K/UL (ref 0–0.2)
BASOPHILS NFR BLD: 0.4 %
BILIRUB SERPL-MCNC: <0.2 MG/DL (ref 0–1)
BUN SERPL-MCNC: 17 MG/DL (ref 7–20)
CALCIUM SERPL-MCNC: 9.3 MG/DL (ref 8.3–10.6)
CHLORIDE SERPL-SCNC: 104 MMOL/L (ref 99–110)
CO2 SERPL-SCNC: 27 MMOL/L (ref 21–32)
CREAT SERPL-MCNC: 0.8 MG/DL (ref 0.6–1.1)
DEPRECATED RDW RBC AUTO: 13.8 % (ref 12.4–15.4)
EOSINOPHIL # BLD: 0.1 K/UL (ref 0–0.6)
EOSINOPHIL NFR BLD: 1.9 %
GFR SERPLBLD CREATININE-BSD FMLA CKD-EPI: >60 ML/MIN/{1.73_M2}
GLUCOSE SERPL-MCNC: 91 MG/DL (ref 70–99)
HCT VFR BLD AUTO: 38.3 % (ref 36–48)
HGB BLD-MCNC: 13.5 G/DL (ref 12–16)
LACTATE BLDV-SCNC: 0.7 MMOL/L (ref 0.4–1.9)
LYMPHOCYTES # BLD: 2.2 K/UL (ref 1–5.1)
LYMPHOCYTES NFR BLD: 28.9 %
MCH RBC QN AUTO: 34.3 PG (ref 26–34)
MCHC RBC AUTO-ENTMCNC: 35.3 G/DL (ref 31–36)
MCV RBC AUTO: 97.3 FL (ref 80–100)
MONOCYTES # BLD: 0.5 K/UL (ref 0–1.3)
MONOCYTES NFR BLD: 7 %
NEUTROPHILS # BLD: 4.8 K/UL (ref 1.7–7.7)
NEUTROPHILS NFR BLD: 61.8 %
PLATELET # BLD AUTO: 299 K/UL (ref 135–450)
PMV BLD AUTO: 7.5 FL (ref 5–10.5)
POTASSIUM SERPL-SCNC: 4.3 MMOL/L (ref 3.5–5.1)
PROT SERPL-MCNC: 6.6 G/DL (ref 6.4–8.2)
RBC # BLD AUTO: 3.93 M/UL (ref 4–5.2)
SODIUM SERPL-SCNC: 139 MMOL/L (ref 136–145)
WBC # BLD AUTO: 7.8 K/UL (ref 4–11)

## 2023-09-19 PROCEDURE — 6360000004 HC RX CONTRAST MEDICATION: Performed by: REGISTERED NURSE

## 2023-09-19 PROCEDURE — 80053 COMPREHEN METABOLIC PANEL: CPT

## 2023-09-19 PROCEDURE — 74177 CT ABD & PELVIS W/CONTRAST: CPT

## 2023-09-19 PROCEDURE — 96374 THER/PROPH/DIAG INJ IV PUSH: CPT

## 2023-09-19 PROCEDURE — 83605 ASSAY OF LACTIC ACID: CPT

## 2023-09-19 PROCEDURE — 85025 COMPLETE CBC W/AUTO DIFF WBC: CPT

## 2023-09-19 PROCEDURE — 6360000002 HC RX W HCPCS: Performed by: STUDENT IN AN ORGANIZED HEALTH CARE EDUCATION/TRAINING PROGRAM

## 2023-09-19 PROCEDURE — 87040 BLOOD CULTURE FOR BACTERIA: CPT

## 2023-09-19 PROCEDURE — 6370000000 HC RX 637 (ALT 250 FOR IP): Performed by: STUDENT IN AN ORGANIZED HEALTH CARE EDUCATION/TRAINING PROGRAM

## 2023-09-19 PROCEDURE — 36415 COLL VENOUS BLD VENIPUNCTURE: CPT

## 2023-09-19 RX ORDER — SULFAMETHOXAZOLE AND TRIMETHOPRIM 800; 160 MG/1; MG/1
1 TABLET ORAL 2 TIMES DAILY
Qty: 20 TABLET | Refills: 0 | Status: SHIPPED | OUTPATIENT
Start: 2023-09-19 | End: 2023-09-29

## 2023-09-19 RX ORDER — KETOROLAC TROMETHAMINE 30 MG/ML
30 INJECTION, SOLUTION INTRAMUSCULAR; INTRAVENOUS ONCE
Status: DISCONTINUED | OUTPATIENT
Start: 2023-09-19 | End: 2023-09-19 | Stop reason: HOSPADM

## 2023-09-19 RX ORDER — SULFAMETHOXAZOLE AND TRIMETHOPRIM 800; 160 MG/1; MG/1
1 TABLET ORAL ONCE
Status: COMPLETED | OUTPATIENT
Start: 2023-09-19 | End: 2023-09-19

## 2023-09-19 RX ORDER — MORPHINE SULFATE 4 MG/ML
4 INJECTION, SOLUTION INTRAMUSCULAR; INTRAVENOUS ONCE
Status: COMPLETED | OUTPATIENT
Start: 2023-09-19 | End: 2023-09-19

## 2023-09-19 RX ADMIN — SULFAMETHOXAZOLE AND TRIMETHOPRIM 1 TABLET: 800; 160 TABLET ORAL at 04:29

## 2023-09-19 RX ADMIN — MORPHINE SULFATE 4 MG: 4 INJECTION, SOLUTION INTRAMUSCULAR; INTRAVENOUS at 01:23

## 2023-09-19 RX ADMIN — IOPAMIDOL 75 ML: 755 INJECTION, SOLUTION INTRAVENOUS at 01:58

## 2023-09-19 ASSESSMENT — PAIN DESCRIPTION - DESCRIPTORS: DESCRIPTORS: THROBBING

## 2023-09-19 ASSESSMENT — PAIN DESCRIPTION - LOCATION
LOCATION: ABDOMEN
LOCATION: ABDOMEN

## 2023-09-19 ASSESSMENT — PAIN SCALES - GENERAL
PAINLEVEL_OUTOF10: 0
PAINLEVEL_OUTOF10: 9
PAINLEVEL_OUTOF10: 8

## 2023-09-19 ASSESSMENT — LIFESTYLE VARIABLES
HOW OFTEN DO YOU HAVE A DRINK CONTAINING ALCOHOL: NEVER
HOW MANY STANDARD DRINKS CONTAINING ALCOHOL DO YOU HAVE ON A TYPICAL DAY: PATIENT DOES NOT DRINK

## 2023-09-19 ASSESSMENT — PAIN - FUNCTIONAL ASSESSMENT: PAIN_FUNCTIONAL_ASSESSMENT: NONE - DENIES PAIN

## 2023-09-19 ASSESSMENT — PAIN DESCRIPTION - ORIENTATION: ORIENTATION: MID

## 2023-09-19 NOTE — ED PROVIDER NOTES
[address] [address] ED  EMERGENCY DEPARTMENT ENCOUNTER        Pt Name: Chung Rey  MRN: [id number]  [address] [facility] 1985  Date of evaluation: 9/18/2023  Provider: KATIA Heller CNP  PCP: No primary care provider on file. This patient was seen and evaluated by the attending physician Tahir Salazar MD.    I have evaluated this patient. My supervising physician was available for consultation. CHIEF COMPLAINT       Chief Complaint   Patient presents with    Abdominal Pain     Abd pain with open draining wound around umbilicus x 1 week. HISTORY OF PRESENT ILLNESS   (Location/Symptom, Timing/Onset, Context/Setting, Quality, Duration, Modifying Factors, Severity)  Note limiting factors. History from : Patient  Chung Rey is a 43 y.o. female who presents via private car for  concern for abdominal infection with draining umbilical wound. Onset was two weeks ago. Duration has been since the onset. Context includes patient presents tonight with concern for abdominal wall infection. She states she has noticed several areas of redness and tenderness to her periumbilical region and approximately 1 week ago noticed purulent drainage from her umbilicus and now has an open wound. She denies chest pain, palpitations. She denies fevers, chills or shortness of breath. She denies nausea or vomiting but states her pain is increasing. Quality is burning, aching  with radiation to her abdominal well. Alleviating factors include nothing. Aggravating factors include palpation. Pain is 8/10. Nothing has been used for pain today. Chart review reveals pt has significant PMHx of COPD, hernia, narcotic abuse, substance abuse. They take albuterol, voltaren, spiriva. Nursing Notes were all reviewed and agreed with or any disagreements were addressed  in the HPI.       REVIEW OF SYSTEMS    (2-9 systems for level 4, 10 or more for level 5)     Review of Systems   Skin:  Positive for rash No resulted procedures found. Medication Contract and Consent for Opioid Use Documents Filed        No documents found                    MDM:   This patient was seen and evaluated by myself and my attending physician. Records Reviewed : Outpatient Notes . Patient presents to the emergency department today with concern for abdominal wall abscess. On exam she is alert and oriented, hemodynamically stable and non toxic in appearance. I discussed a plan for evaluation with her including laboratory studies and images. She will be medicated with toradol for pain. Further workup is pending at this time. Care of the patient transitioned to my attending physician who will determine further workup and final disposition. Social Determinants Significantly Affecting Health : Patient has significant healthcare illiteracy    Is this patient to be included in the SEP-1 Core Measure due to severe sepsis or septic shock? No   Exclusion criteria - the patient is NOT to be included for SEP-1 Core Measure due to:  Evaluation pending at this time    Discharge Time out:  CC Reviewed Yes   Test Results Yes     Vitals:    09/19/23 0400   BP: (!) 166/97   Pulse: 82   Resp:    Temp:    SpO2: 100%              FINAL IMPRESSION      1. Cellulitis of other specified site    2. Generalized abdominal pain    3.  Abscess          DISPOSITION/PLAN   DISPOSITION Decision To Discharge 09/19/2023 04:20:37 AM      PATIENT REFERREDTO:  Joselo Lucas MD  [address] [place] [address] Dr Lana Kirkland [address] [place] [address] [phone]    Schedule an appointment as soon as possible for a visit         DISCHARGE MEDICATIONS:  Discharge Medication List as of 9/19/2023  4:22 AM        START taking these medications    Details   sulfamethoxazole-trimethoprim (BACTRIM DS;SEPTRA DS) 800-160 MG per tablet Take 1 tablet by mouth 2 times daily for 10 days, Disp-20 tablet, R-0Normal             DISCONTINUED MEDICATIONS:  Discharge Medication List as of 9/19/2023

## 2023-09-20 NOTE — ED PROVIDER NOTES
Emergency Department Attending Provider Note  Location: [address] [place] [address] ED  9/18/2023     Patient Identification  Patel Nuñez is a 43 y.o. female      Patel Nuñez was evaluated in the Emergency Department for abdominal pain. Although initial history and physical exam information was obtained by Lluvia MONTALVO (who also dictated a record of this visit), I personally saw the patient and performed a substantive portion of the visit including all aspects of the medical decision making. Patient seen and evaluated. Relevant records reviewed. Patient is a 39-year-old female who presents with abdominal pain and concern for infection. States that symptoms started roughly 2 weeks ago. She has noted some redness and purulent drainage coming from her umbilicus. She is endorsing slight wound dehiscence. Denies any fever or chills. Denies any chest pain or shortness of breath. Denies any nausea or vomiting. She has not been on antibiotics for this. She has a history of midline incision from hysterectomy years ago. Patient presented hypertensive but vitals otherwise unremarkable. On exam she is obese. She has mild abdominal tenderness with no rebound or guarding. There are sites of cellulitis near the umbilicus with scant purulent discharge expressed. There was again no peritoneal signs. Given her history and exam we were concerned for underlying intra-abdominal infection, cellulitis and abscess. We also considered underlying sepsis although she is not tachycardic with no hypoxia and normal respiratory rate. She is afebrile. Her CBC showed a normal white blood cell count, no evidence of anemia and normal platelets. CMP shows normal electrolytes, normal renal function, normal LFTs and bilirubin. Her lactic acid was normal.  We obtained blood cultures which are pending.   Her CT abdomen pelvis showed mild inflammatory stranding involving the subcutaneous soft tissues with a questionable

## 2023-09-23 LAB
BACTERIA BLD CULT ORG #2: NORMAL
BACTERIA BLD CULT: NORMAL

## 2024-02-12 ENCOUNTER — APPOINTMENT (OUTPATIENT)
Dept: GENERAL RADIOLOGY | Age: 39
End: 2024-02-12
Payer: COMMERCIAL

## 2024-02-12 ENCOUNTER — HOSPITAL ENCOUNTER (EMERGENCY)
Age: 39
Discharge: HOME | End: 2024-02-12
Payer: COMMERCIAL

## 2024-02-12 VITALS
BODY MASS INDEX: 47.12 KG/M2 | OXYGEN SATURATION: 99 % | HEIGHT: 60 IN | WEIGHT: 240 LBS | SYSTOLIC BLOOD PRESSURE: 131 MMHG | TEMPERATURE: 98.8 F | DIASTOLIC BLOOD PRESSURE: 95 MMHG | RESPIRATION RATE: 18 BRPM | HEART RATE: 98 BPM

## 2024-02-12 DIAGNOSIS — C32.1 MALIGNANT NEOPLASM OF SUPRAGLOTTIS (HCC): ICD-10-CM
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
## 2024-02-12 DIAGNOSIS — S63.501A RIGHT WRIST SPRAIN, INITIAL ENCOUNTER: ICD-10-CM

## 2024-02-12 DIAGNOSIS — S70.01XA CONTUSION OF RIGHT HIP, INITIAL ENCOUNTER: ICD-10-CM

## 2024-02-12 PROCEDURE — 99283 EMERGENCY DEPT VISIT LOW MDM: CPT

## 2024-02-12 PROCEDURE — 73110 X-RAY EXAM OF WRIST: CPT

## 2024-02-12 RX ORDER — OXYCODONE HYDROCHLORIDE AND ACETAMINOPHEN 5; 325 MG/1; MG/1
1 TABLET ORAL EVERY 6 HOURS PRN
COMMUNITY
Start: 2024-02-05

## 2024-02-12 RX ORDER — GABAPENTIN 100 MG/1
CAPSULE ORAL
COMMUNITY
Start: 2024-01-13
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
## 2024-06-05 ENCOUNTER — APPOINTMENT (OUTPATIENT)
Dept: GENERAL RADIOLOGY | Age: 39
End: 2024-06-05
Payer: COMMERCIAL

## 2024-06-05 ENCOUNTER — HOSPITAL ENCOUNTER (EMERGENCY)
Age: 39
Discharge: HOME | End: 2024-06-05
Attending: STUDENT IN AN ORGANIZED HEALTH CARE EDUCATION/TRAINING PROGRAM
Payer: COMMERCIAL

## 2024-06-05 ENCOUNTER — APPOINTMENT (OUTPATIENT)
Dept: CT IMAGING | Age: 39
End: 2024-06-05
Payer: COMMERCIAL

## 2024-06-05 VITALS
RESPIRATION RATE: 18 BRPM | TEMPERATURE: 98.4 F | HEIGHT: 60 IN | SYSTOLIC BLOOD PRESSURE: 149 MMHG | BODY MASS INDEX: 35.34 KG/M2 | OXYGEN SATURATION: 100 % | WEIGHT: 180 LBS | HEART RATE: 87 BPM | DIASTOLIC BLOOD PRESSURE: 104 MMHG

## 2024-06-05 DIAGNOSIS — M79.89 LEG SWELLING: Primary | ICD-10-CM

## 2024-06-05 DIAGNOSIS — S80.02XA CONTUSION OF LEFT KNEE, INITIAL ENCOUNTER: ICD-10-CM

## 2024-06-05 DIAGNOSIS — D64.9 ANEMIA, UNSPECIFIED TYPE: ICD-10-CM

## 2024-06-05 DIAGNOSIS — R06.02 SHORTNESS OF BREATH: ICD-10-CM

## 2024-06-05 LAB
ALBUMIN SERPL-MCNC: 3 G/DL (ref 3.4–5)
ALBUMIN/GLOB SERPL: 1.3 {RATIO} (ref 1.1–2.2)
ALP SERPL-CCNC: 62 U/L (ref 40–129)
ALT SERPL-CCNC: 12 U/L (ref 10–40)
ANION GAP SERPL CALCULATED.3IONS-SCNC: 12 MMOL/L (ref 3–16)
ANISOCYTOSIS BLD QL SMEAR: ABNORMAL
AST SERPL-CCNC: 21 U/L (ref 15–37)
BASOPHILS # BLD: 0 K/UL (ref 0–0.2)
BASOPHILS NFR BLD: 1 %
BILIRUB SERPL-MCNC: 0.5 MG/DL (ref 0–1)
BUN SERPL-MCNC: 11 MG/DL (ref 7–20)
CALCIUM SERPL-MCNC: 8.3 MG/DL (ref 8.3–10.6)
CHLORIDE SERPL-SCNC: 95 MMOL/L (ref 99–110)
CO2 SERPL-SCNC: 29 MMOL/L (ref 21–32)
CREAT SERPL-MCNC: 0.6 MG/DL (ref 0.6–1.1)
DEPRECATED RDW RBC AUTO: 23.6 % (ref 12.4–15.4)
EOSINOPHIL # BLD: 0 K/UL (ref 0–0.6)
EOSINOPHIL NFR BLD: 1.2 %
GFR SERPLBLD CREATININE-BSD FMLA CKD-EPI: >90 ML/MIN/{1.73_M2}
GLUCOSE SERPL-MCNC: 94 MG/DL (ref 70–99)
HCT VFR BLD AUTO: 25.3 % (ref 36–48)
HGB BLD-MCNC: 8.5 G/DL (ref 12–16)
LACTATE BLDV-SCNC: 1 MMOL/L (ref 0.4–1.9)
LYMPHOCYTES # BLD: 0.4 K/UL (ref 1–5.1)
LYMPHOCYTES NFR BLD: 10.6 %
MACROCYTES BLD QL SMEAR: ABNORMAL
MCH RBC QN AUTO: 37.5 PG (ref 26–34)
MCHC RBC AUTO-ENTMCNC: 33.6 G/DL (ref 31–36)
MCV RBC AUTO: 111.7 FL (ref 80–100)
MICROCYTES BLD QL SMEAR: ABNORMAL
MONOCYTES # BLD: 0.2 K/UL (ref 0–1.3)
MONOCYTES NFR BLD: 6.2 %
NEUTROPHILS # BLD: 3.1 K/UL (ref 1.7–7.7)
NEUTROPHILS NFR BLD: 81 %
OVALOCYTES BLD QL SMEAR: ABNORMAL
PATH INTERP BLD-IMP: YES
PLATELET # BLD AUTO: 209 K/UL (ref 135–450)
PLATELET BLD QL SMEAR: ADEQUATE
PMV BLD AUTO: 8.3 FL (ref 5–10.5)
POIKILOCYTOSIS BLD QL SMEAR: ABNORMAL
POLYCHROMASIA BLD QL SMEAR: ABNORMAL
POTASSIUM SERPL-SCNC: 3.6 MMOL/L (ref 3.5–5.1)
PROT SERPL-MCNC: 5.4 G/DL (ref 6.4–8.2)
RBC # BLD AUTO: 2.27 M/UL (ref 4–5.2)
SCHISTOCYTES BLD QL SMEAR: ABNORMAL
SLIDE REVIEW: ABNORMAL
SODIUM SERPL-SCNC: 136 MMOL/L (ref 136–145)
WBC # BLD AUTO: 3.8 K/UL (ref 4–11)

## 2024-06-05 PROCEDURE — 80053 COMPREHEN METABOLIC PANEL: CPT

## 2024-06-05 PROCEDURE — 85025 COMPLETE CBC W/AUTO DIFF WBC: CPT

## 2024-06-05 PROCEDURE — 6360000002 HC RX W HCPCS: Performed by: STUDENT IN AN ORGANIZED HEALTH CARE EDUCATION/TRAINING PROGRAM

## 2024-06-05 PROCEDURE — 6360000004 HC RX CONTRAST MEDICATION: Performed by: STUDENT IN AN ORGANIZED HEALTH CARE EDUCATION/TRAINING PROGRAM

## 2024-06-05 PROCEDURE — 6370000000 HC RX 637 (ALT 250 FOR IP): Performed by: PHYSICIAN ASSISTANT

## 2024-06-05 PROCEDURE — 6360000002 HC RX W HCPCS: Performed by: PHYSICIAN ASSISTANT

## 2024-06-05 PROCEDURE — 71045 X-RAY EXAM CHEST 1 VIEW: CPT

## 2024-06-05 PROCEDURE — 83605 ASSAY OF LACTIC ACID: CPT

## 2024-06-05 PROCEDURE — 96375 TX/PRO/DX INJ NEW DRUG ADDON: CPT

## 2024-06-05 PROCEDURE — 73560 X-RAY EXAM OF KNEE 1 OR 2: CPT

## 2024-06-05 PROCEDURE — 99285 EMERGENCY DEPT VISIT HI MDM: CPT

## 2024-06-05 PROCEDURE — 87040 BLOOD CULTURE FOR BACTERIA: CPT

## 2024-06-05 PROCEDURE — 2580000003 HC RX 258: Performed by: PHYSICIAN ASSISTANT

## 2024-06-05 PROCEDURE — 71260 CT THORAX DX C+: CPT

## 2024-06-05 PROCEDURE — 96374 THER/PROPH/DIAG INJ IV PUSH: CPT

## 2024-06-05 PROCEDURE — 36415 COLL VENOUS BLD VENIPUNCTURE: CPT

## 2024-06-05 RX ORDER — ONDANSETRON 2 MG/ML
4 INJECTION INTRAMUSCULAR; INTRAVENOUS ONCE
Status: COMPLETED | OUTPATIENT
Start: 2024-06-05 | End: 2024-06-05

## 2024-06-05 RX ORDER — MORPHINE SULFATE 4 MG/ML
4 INJECTION, SOLUTION INTRAMUSCULAR; INTRAVENOUS ONCE
Status: COMPLETED | OUTPATIENT
Start: 2024-06-05 | End: 2024-06-05

## 2024-06-05 RX ORDER — 0.9 % SODIUM CHLORIDE 0.9 %
500 INTRAVENOUS SOLUTION INTRAVENOUS ONCE
Status: COMPLETED | OUTPATIENT
Start: 2024-06-05 | End: 2024-06-05

## 2024-06-05 RX ORDER — OXYCODONE HYDROCHLORIDE 5 MG/1
10 TABLET ORAL ONCE
Status: COMPLETED | OUTPATIENT
Start: 2024-06-05 | End: 2024-06-05

## 2024-06-05 RX ADMIN — SODIUM CHLORIDE 500 ML: 9 INJECTION, SOLUTION INTRAVENOUS at 17:51

## 2024-06-05 RX ADMIN — IOPAMIDOL 75 ML: 755 INJECTION, SOLUTION INTRAVENOUS at 19:39

## 2024-06-05 RX ADMIN — MORPHINE SULFATE 4 MG: 4 INJECTION, SOLUTION INTRAMUSCULAR; INTRAVENOUS at 20:14

## 2024-06-05 RX ADMIN — ONDANSETRON 4 MG: 2 INJECTION INTRAMUSCULAR; INTRAVENOUS at 17:48

## 2024-06-05 RX ADMIN — OXYCODONE HYDROCHLORIDE 10 MG: 5 TABLET ORAL at 17:49

## 2024-06-05 ASSESSMENT — ENCOUNTER SYMPTOMS
CHEST TIGHTNESS: 0
SINUS PAIN: 0
ABDOMINAL PAIN: 0
EYE DISCHARGE: 0
SINUS PRESSURE: 0
SORE THROAT: 0
DIARRHEA: 0
CONSTIPATION: 0
RHINORRHEA: 0
SHORTNESS OF BREATH: 1
COLOR CHANGE: 0
NAUSEA: 1
COUGH: 0
VOMITING: 1
EYE REDNESS: 0

## 2024-06-05 ASSESSMENT — PAIN DESCRIPTION - PAIN TYPE: TYPE: ACUTE PAIN

## 2024-06-05 ASSESSMENT — PAIN DESCRIPTION - ORIENTATION: ORIENTATION: LEFT

## 2024-06-05 ASSESSMENT — PAIN - FUNCTIONAL ASSESSMENT: PAIN_FUNCTIONAL_ASSESSMENT: 0-10

## 2024-06-05 ASSESSMENT — PAIN DESCRIPTION - LOCATION: LOCATION: KNEE;LEG

## 2024-06-05 ASSESSMENT — PAIN SCALES - GENERAL
PAINLEVEL_OUTOF10: 9
PAINLEVEL_OUTOF10: 8

## 2024-06-05 NOTE — DISCHARGE INSTR - COC
Manager/ signature: {Esignature:[id number]}    PHYSICIAN SECTION    Prognosis: {Prognosis:2387047355}    Condition at Discharge: { Patient Condition:473733412}    Rehab Potential (if transferring to Rehab): {Prognosis:0830265622}    Recommended Labs or Other Treatments After Discharge: ***    Physician Certification: I certify the above information and transfer of Kenny Becerril  is necessary for the continuing treatment of the diagnosis listed and that she requires {Admit to Appropriate Level of Care:97902} for {GREATER/LESS:167127317} 30 days.     Update Admission H&P: {CHP DME Changes in HandP:624454696}    PHYSICIAN SIGNATURE:  {Esignature:[id number]}

## 2024-06-05 NOTE — ED PROVIDER NOTES
I independently performed a history and physical on Kenny Becerril.     I have discussed the case with the ROBERTA/resident at 1746 and approve / take responsibility for the initial management plan and anticipated disposition as documented below.     In summary the patient presents with concern for left calf pain on a background of chronic swelling of the left lower extremity on a background of current therapy for throat cancer.  On my evaluation the patient is afebrile hemodynamically stable in no acute distress.  Her breath sounds are clear abdomen soft nontender nondistended extremities are warm and well-perfused.  There is unilateral leg swelling left lower extremity with 4+ edema.  The patient again reports the edema itself is not new but some pain at the posterior calf is new and she is concern for a blood clot.  Of note the patient already is on anticoagulation the context of history of DVT.  Workup is reviewed bedside she has leukopenia and anemia recently attributed to recent course of chemotherapy ANC is adequate.  Remainder of workup otherwise reassuring.  On further questioning she did report a fall onto the left knee and does have some small anterior swelling though her x-ray could raise concern for a bursitis as well.  Overall her course has again been reassuring and we shared decision making we will discharge with an increased dose of her anticoagulation and she will obtain an ultrasound for DVT tomorrow.    I interpreted the following studies:  na    I personally discussed the patient's management with the following:  na         For further details of Kenny Becerril's emergency department encounter, please see the ROBERTA/resident's documentation. Please note the signature time recorded here indicates the limit of my supervision of this case and should the patient require further management prior to disposition I have signed the case out to my colleague Wilfrido Aragon MD  06/05/24 4167    
change. Negative for chills, fatigue and fever.   HENT: Negative.  Negative for congestion, rhinorrhea, sinus pressure, sinus pain and sore throat.    Eyes:  Negative for discharge, redness and visual disturbance.   Respiratory:  Positive for shortness of breath. Negative for cough and chest tightness.    Cardiovascular:  Positive for leg swelling. Negative for chest pain and palpitations.   Gastrointestinal:  Positive for nausea and vomiting. Negative for abdominal pain, constipation and diarrhea.   Genitourinary: Negative.  Negative for difficulty urinating, dysuria and frequency.   Musculoskeletal: Negative.    Skin: Negative.  Negative for color change and rash.   Neurological: Negative.  Negative for dizziness, weakness, numbness and headaches.   Hematological: Negative.    Psychiatric/Behavioral: Negative.     All other systems reviewed and are negative.      Positives and Pertinent negatives as per HPI.     SURGICAL HISTORY     Past Surgical History:   Procedure Laterality Date     SECTION      X 3    CHOLECYSTECTOMY  2011    ENDOMETRIAL ABLATION      X 3    HERNIA REPAIR      PARTIAL HYSTERECTOMY (CERVIX NOT REMOVED)      PELVIC LAPAROSCOPY         CURRENTMEDICATIONS       Discharge Medication List as of 2024  8:56 PM        CONTINUE these medications which have NOT CHANGED    Details   gabapentin (NEURONTIN) 100 MG capsule TAKE 2 CAPSULES EVERY DAY BY ORAL ROUTE AT NOON FOR 30 DAYS.Historical Med      oxyCODONE-acetaminophen (PERCOCET) 5-325 MG per tablet Take 1 tablet by mouth every 6 hours as needed.Historical Med      mupirocin (BACTROBAN NASAL) 2 % nasal ointment Take by Nasal route 2 times daily., Disp-1 each, R-1, Normal      diclofenac (VOLTAREN) 75 MG EC tablet Take 1 tablet by mouth 2 times daily, Disp-60 tablet, R-3Normal      nicotine polacrilex (NICORETTE) 2 MG gum Take 1 each by mouth as needed for Smoking cessation, Disp-110 each, R-3Normal      albuterol sulfate

## 2024-06-06 LAB — PATH INTERP BLD-IMP: NORMAL

## 2024-06-06 NOTE — DISCHARGE INSTRUCTIONS
Please get doppler study of your left leg ASAP. You can call [address] [place] [address] to schedule   Follow up with oncology on anemia

## 2024-06-09 LAB — BACTERIA BLD CULT: NORMAL

## 2024-08-04 ENCOUNTER — APPOINTMENT (OUTPATIENT)
Dept: GENERAL RADIOLOGY | Age: 39
End: 2024-08-04
Payer: COMMERCIAL

## 2024-08-04 ENCOUNTER — HOSPITAL ENCOUNTER (EMERGENCY)
Age: 39
Discharge: HOME | End: 2024-08-04
Payer: COMMERCIAL

## 2024-08-04 VITALS
TEMPERATURE: 98.4 F | SYSTOLIC BLOOD PRESSURE: 130 MMHG | WEIGHT: 160 LBS | OXYGEN SATURATION: 97 % | HEART RATE: 76 BPM | RESPIRATION RATE: 16 BRPM | HEIGHT: 60 IN | BODY MASS INDEX: 31.41 KG/M2 | DIASTOLIC BLOOD PRESSURE: 98 MMHG

## 2024-08-04 DIAGNOSIS — R79.89 ELEVATED BRAIN NATRIURETIC PEPTIDE (BNP) LEVEL: ICD-10-CM

## 2024-08-04 DIAGNOSIS — M79.89 LEG SWELLING: ICD-10-CM

## 2024-08-04 DIAGNOSIS — M70.42 PREPATELLAR BURSITIS OF LEFT KNEE: Primary | ICD-10-CM

## 2024-08-04 LAB
ALBUMIN SERPL-MCNC: 3 G/DL (ref 3.4–5)
ALBUMIN/GLOB SERPL: 1.2 {RATIO} (ref 1.1–2.2)
ALP SERPL-CCNC: 108 U/L (ref 40–129)
ALT SERPL-CCNC: 14 U/L (ref 10–40)
ANION GAP SERPL CALCULATED.3IONS-SCNC: 8 MMOL/L (ref 3–16)
ANISOCYTOSIS BLD QL SMEAR: ABNORMAL
AST SERPL-CCNC: 18 U/L (ref 15–37)
BASOPHILS # BLD: 0 K/UL (ref 0–0.2)
BASOPHILS NFR BLD: 0.4 %
BILIRUB SERPL-MCNC: 0.3 MG/DL (ref 0–1)
BUN SERPL-MCNC: 12 MG/DL (ref 7–20)
CALCIUM SERPL-MCNC: 8.5 MG/DL (ref 8.3–10.6)
CHLORIDE SERPL-SCNC: 103 MMOL/L (ref 99–110)
CO2 SERPL-SCNC: 30 MMOL/L (ref 21–32)
CREAT SERPL-MCNC: 0.8 MG/DL (ref 0.6–1.1)
DEPRECATED RDW RBC AUTO: 20 % (ref 12.4–15.4)
EOSINOPHIL # BLD: 0.1 K/UL (ref 0–0.6)
EOSINOPHIL NFR BLD: 3.4 %
GFR SERPLBLD CREATININE-BSD FMLA CKD-EPI: >90 ML/MIN/{1.73_M2}
HCT VFR BLD AUTO: 26 % (ref 36–48)
HGB BLD-MCNC: 8.8 G/DL (ref 12–16)
LYMPHOCYTES # BLD: 0.6 K/UL (ref 1–5.1)
LYMPHOCYTES NFR BLD: 18.3 %
MACROCYTES BLD QL SMEAR: ABNORMAL
MAGNESIUM SERPL-MCNC: 1.8 MG/DL (ref 1.8–2.4)
MCH RBC QN AUTO: 41.7 PG (ref 26–34)
MCHC RBC AUTO-ENTMCNC: 33.8 G/DL (ref 31–36)
MCV RBC AUTO: 123.1 FL (ref 80–100)
MICROCYTES BLD QL SMEAR: ABNORMAL
MONOCYTES # BLD: 0.3 K/UL (ref 0–1.3)
MONOCYTES NFR BLD: 8.1 %
NEUTROPHILS # BLD: 2.2 K/UL (ref 1.7–7.7)
NEUTROPHILS NFR BLD: 69.8 %
NT-PROBNP SERPL-MCNC: 391 PG/ML (ref 0–124)
OVALOCYTES BLD QL SMEAR: ABNORMAL
PATH INTERP BLD-IMP: YES
PLATELET # BLD AUTO: 262 K/UL (ref 135–450)
PLATELET BLD QL SMEAR: ADEQUATE
PMV BLD AUTO: 7.6 FL (ref 5–10.5)
POIKILOCYTOSIS BLD QL SMEAR: ABNORMAL
POTASSIUM SERPL-SCNC: 3.4 MMOL/L (ref 3.5–5.1)
PROT SERPL-MCNC: 5.5 G/DL (ref 6.4–8.2)
RBC # BLD AUTO: 2.11 M/UL (ref 4–5.2)
SCHISTOCYTES BLD QL SMEAR: ABNORMAL
SLIDE REVIEW: ABNORMAL
SODIUM SERPL-SCNC: 141 MMOL/L (ref 136–145)
STOMATOCYTES BLD QL SMEAR: ABNORMAL
TARGETS BLD QL SMEAR: ABNORMAL
TROPONIN, HIGH SENSITIVITY: 7 NG/L (ref 0–14)
TROPONIN, HIGH SENSITIVITY: 7 NG/L (ref 0–14)
WBC # BLD AUTO: 3.1 K/UL (ref 4–11)

## 2024-08-04 PROCEDURE — 71045 X-RAY EXAM CHEST 1 VIEW: CPT

## 2024-08-04 PROCEDURE — 93005 ELECTROCARDIOGRAM TRACING: CPT | Performed by: PHYSICIAN ASSISTANT

## 2024-08-04 PROCEDURE — 85025 COMPLETE CBC W/AUTO DIFF WBC: CPT

## 2024-08-04 PROCEDURE — 99285 EMERGENCY DEPT VISIT HI MDM: CPT

## 2024-08-04 PROCEDURE — 83735 ASSAY OF MAGNESIUM: CPT

## 2024-08-04 PROCEDURE — 96374 THER/PROPH/DIAG INJ IV PUSH: CPT

## 2024-08-04 PROCEDURE — 36415 COLL VENOUS BLD VENIPUNCTURE: CPT

## 2024-08-04 PROCEDURE — 73560 X-RAY EXAM OF KNEE 1 OR 2: CPT

## 2024-08-04 PROCEDURE — 84484 ASSAY OF TROPONIN QUANT: CPT

## 2024-08-04 PROCEDURE — 6360000002 HC RX W HCPCS: Performed by: PHYSICIAN ASSISTANT

## 2024-08-04 PROCEDURE — 83880 ASSAY OF NATRIURETIC PEPTIDE: CPT

## 2024-08-04 PROCEDURE — 80053 COMPREHEN METABOLIC PANEL: CPT

## 2024-08-04 RX ORDER — FUROSEMIDE 20 MG/1
20 TABLET ORAL DAILY
Qty: 3 TABLET | Refills: 0 | Status: SHIPPED | OUTPATIENT
Start: 2024-08-04 | End: 2024-08-07

## 2024-08-04 RX ORDER — POTASSIUM CHLORIDE 20 MEQ/1
20 TABLET, EXTENDED RELEASE ORAL 2 TIMES DAILY
Qty: 14 TABLET | Refills: 0 | Status: SHIPPED | OUTPATIENT
Start: 2024-08-04 | End: 2024-08-11

## 2024-08-04 RX ORDER — MORPHINE SULFATE 4 MG/ML
4 INJECTION, SOLUTION INTRAMUSCULAR; INTRAVENOUS ONCE
Status: COMPLETED | OUTPATIENT
Start: 2024-08-04 | End: 2024-08-04

## 2024-08-04 RX ORDER — OXYCODONE HYDROCHLORIDE 10 MG/1
10 TABLET ORAL EVERY 4 HOURS PRN
COMMUNITY
Start: 2024-07-30 | End: 2024-08-27

## 2024-08-04 RX ORDER — MORPHINE SULFATE 60 MG/1
60 TABLET, FILM COATED, EXTENDED RELEASE ORAL 2 TIMES DAILY
COMMUNITY
Start: 2024-07-30 | End: 2024-08-27

## 2024-08-04 RX ADMIN — MORPHINE SULFATE 4 MG: 4 INJECTION, SOLUTION INTRAMUSCULAR; INTRAVENOUS at 19:49

## 2024-08-04 ASSESSMENT — PAIN SCALES - GENERAL: PAINLEVEL_OUTOF10: 10

## 2024-08-04 ASSESSMENT — PAIN - FUNCTIONAL ASSESSMENT: PAIN_FUNCTIONAL_ASSESSMENT: 0-10

## 2024-08-04 ASSESSMENT — PAIN DESCRIPTION - ORIENTATION: ORIENTATION: LEFT

## 2024-08-04 ASSESSMENT — PAIN DESCRIPTION - LOCATION: LOCATION: ARM;LEG

## 2024-08-05 ENCOUNTER — TELEPHONE (OUTPATIENT)
Dept: ORTHOPEDIC SURGERY | Age: 39
End: 2024-08-05

## 2024-08-05 LAB
EKG ATRIAL RATE: 85 BPM
EKG DIAGNOSIS: NORMAL
EKG P AXIS: 36 DEGREES
EKG P-R INTERVAL: 126 MS
EKG Q-T INTERVAL: 388 MS
EKG QRS DURATION: 74 MS
EKG QTC CALCULATION (BAZETT): 461 MS
EKG R AXIS: 44 DEGREES
EKG T AXIS: 31 DEGREES
EKG VENTRICULAR RATE: 85 BPM
PATH INTERP BLD-IMP: NORMAL

## 2024-08-05 PROCEDURE — 93010 ELECTROCARDIOGRAM REPORT: CPT | Performed by: INTERNAL MEDICINE

## 2024-08-05 NOTE — ED PROVIDER NOTES
available at the time of this note:    XR KNEE LEFT (1-2 VIEWS)   Final Result   No acute fracture or dislocation is identified.         XR CHEST PORTABLE   Final Result   1. No acute cardiopulmonary process.   2. Right IJ central venous catheter remains in place.           XR CHEST PORTABLE    Result Date: 8/4/2024  EXAMINATION: ONE XRAY VIEW OF THE CHEST 8/4/2024 8:05 pm COMPARISON: 06/05/2024 HISTORY: ORDERING SYSTEM PROVIDED HISTORY: Shortness of Breath TECHNOLOGIST PROVIDED HISTORY: Reason for exam:->Shortness of Breath Reason for Exam: Shortness of Breath FINDINGS: Right IJ central venous catheter remains in place with the tip projecting to the SVC. The cardiomediastinal silhouette is normal.  The lungs appear clear, no focal infiltrates, overt congestion or pleural effusions.  No pneumothorax.  No acute bony abnormalities.  Left clavicle fixation plate and screws are again seen.     1. No acute cardiopulmonary process. 2. Right IJ central venous catheter remains in place.       No results found.    PROCEDURES   Unless otherwise noted below, none     Procedures    CRITICAL CARE TIME (.cctime)   None    PAST MEDICAL HISTORY      has a past medical history of Adhesions; postoperative, Cancer (HCC), Chronic abdominal pain, COPD (chronic obstructive pulmonary disease) (HCC), Hernia of unspecified site of abdominal cavity without mention of obstruction or gangrene, Narcotic abuse (HCC), and Substance abuse (HCC).     Chronic Conditions affecting Care: Cancer    EMERGENCY DEPARTMENT COURSE and DIFFERENTIAL DIAGNOSIS/MDM:   Vitals:    Vitals:    08/04/24 1900 08/04/24 1954 08/04/24 2038   BP: (!) 144/84 (!) 145/102 (!) 130/98   Pulse: 85 83 76   Resp: 18 16 16   Temp: 98.4 °F (36.9 °C)     TempSrc: Oral     SpO2: 99% 100% 97%   Weight: 72.6 kg (160 lb)     Height: 1.524 m (5')         Patient was given the following medications:  Medications   morphine sulfate (PF) injection 4 mg (4 mg IntraVENous Given 8/4/24 1949)

## 2024-08-05 NOTE — DISCHARGE INSTRUCTIONS
Please for follow-up with your primary care physician.  I have prescribed a very short course of Lasix which should help with your leg swelling.  I have also prescribed potassium because her potassium was a little bit low in the emergency department today.    I suspect you have a prepatellar bursitis of the knee.  This is typically treated with NSAIDs, rest and warm compresses.  Please follow-up with orthopedics for further evaluation and treatment.

## 2024-08-05 NOTE — TELEPHONE ENCOUNTER
Left message for patient to return call if they would like to schedule a follow up appointment.Upon return call please schedule appt with Dr. Wolfe.

## 2025-07-07 ENCOUNTER — OFFICE VISIT (OUTPATIENT)
Dept: PAIN MANAGEMENT | Age: 40
End: 2025-07-07
Payer: COMMERCIAL

## 2025-07-07 VITALS
DIASTOLIC BLOOD PRESSURE: 109 MMHG | HEART RATE: 100 BPM | WEIGHT: 210 LBS | OXYGEN SATURATION: 100 % | SYSTOLIC BLOOD PRESSURE: 157 MMHG | BODY MASS INDEX: 41.01 KG/M2

## 2025-07-07 DIAGNOSIS — Z51.81 ENCOUNTER FOR THERAPEUTIC DRUG MONITORING: ICD-10-CM

## 2025-07-07 DIAGNOSIS — G89.3 CHRONIC PAIN AFTER CANCER TREATMENT: ICD-10-CM

## 2025-07-07 DIAGNOSIS — E66.09 OBESITY DUE TO EXCESS CALORIES IN PEDIATRIC PATIENT, UNSPECIFIED BMI, UNSPECIFIED WHETHER SERIOUS COMORBIDITY PRESENT: Primary | ICD-10-CM

## 2025-07-07 DIAGNOSIS — C76.0 CANCER OF HEAD AND NECK (HCC): ICD-10-CM

## 2025-07-07 DIAGNOSIS — G89.4 CHRONIC PAIN SYNDROME: ICD-10-CM

## 2025-07-07 DIAGNOSIS — R07.0 THROAT PAIN IN ADULT: ICD-10-CM

## 2025-07-07 PROCEDURE — 99244 OFF/OP CNSLTJ NEW/EST MOD 40: CPT | Performed by: NURSE PRACTITIONER

## 2025-07-07 PROCEDURE — G8427 DOCREV CUR MEDS BY ELIG CLIN: HCPCS | Performed by: NURSE PRACTITIONER

## 2025-07-07 PROCEDURE — G8417 CALC BMI ABV UP PARAM F/U: HCPCS | Performed by: NURSE PRACTITIONER

## 2025-07-07 RX ORDER — CLONIDINE HYDROCHLORIDE 0.1 MG/1
0.1 TABLET ORAL 3 TIMES DAILY PRN
COMMUNITY
Start: 2025-04-17

## 2025-07-07 RX ORDER — ONDANSETRON 4 MG/1
4 TABLET, FILM COATED ORAL EVERY 8 HOURS PRN
COMMUNITY
Start: 2025-04-17

## 2025-07-07 RX ORDER — LEVOTHYROXINE SODIUM 75 UG/1
75 TABLET ORAL DAILY
COMMUNITY
Start: 2025-04-15

## 2025-07-07 NOTE — PROGRESS NOTES
condition   Origin: Her health  Antidepressants/Anxiolytics: None  Sleep patterns: Fair 61/2 hrs.    Patient denies misusing/abusing her narcotic pain medications or using any illegal drugs. she denies morning stiffness, fatigue, and headaches.Currently on disability, lives with /children. Smokes 1/2 a pack of cigarettes a day, denies alcohol consumption.    ROS:  The patient's social history, past medical history, family history, medications, allergies and review of systems have all been reviewed and verified from  the patient questionnaire form which has been filled by the patient.  The  allergies, medication list  and past medical and surgical history and other information recorded by the MA was again reviewed today. Please check page 6 of the patient questionnaire for for family history   These forms have been scanned into the \"media\" tab of patients electronic medical record.    PHYSICAL EXAM:  Please see the physical exam form for a detailed examination on this visit.This form has been completed and scanned into the  Media section of the chart     Physical Exam  Constitutional:       General: She is not in acute distress.     Appearance: She is well-developed.   HENT:      Head: Atraumatic. Microcephalic.      Salivary Glands: Left salivary gland is tender.        Comments: Tenderness with palpation beneath the lower jaw, around the neck.     Nose: Nose normal.   Eyes:      Conjunctiva/sclera: Conjunctivae normal.      Pupils: Pupils are equal, round, and reactive to light.   Neck:      Thyroid: No thyromegaly.      Trachea: Tracheal tenderness present.        Comments: Pain/tenderness with palpation, negative for swelling or redness  Cardiovascular:      Rate and Rhythm: Normal rate and regular rhythm.      Heart sounds: Normal heart sounds.   Pulmonary:      Effort: Pulmonary effort is normal. No respiratory distress.      Breath sounds: Normal breath sounds.   Abdominal:      General: Bowel sounds

## 2025-08-06 ENCOUNTER — OFFICE VISIT (OUTPATIENT)
Dept: PAIN MANAGEMENT | Age: 40
End: 2025-08-06
Payer: COMMERCIAL

## 2025-08-06 VITALS
BODY MASS INDEX: 41.01 KG/M2 | HEART RATE: 90 BPM | OXYGEN SATURATION: 98 % | SYSTOLIC BLOOD PRESSURE: 159 MMHG | WEIGHT: 210 LBS | DIASTOLIC BLOOD PRESSURE: 102 MMHG

## 2025-08-06 DIAGNOSIS — C76.0 CANCER OF HEAD AND NECK (HCC): ICD-10-CM

## 2025-08-06 DIAGNOSIS — G89.4 CHRONIC PAIN SYNDROME: ICD-10-CM

## 2025-08-06 DIAGNOSIS — G89.3 CHRONIC PAIN AFTER CANCER TREATMENT: ICD-10-CM

## 2025-08-06 DIAGNOSIS — R07.0 THROAT PAIN IN ADULT: ICD-10-CM

## 2025-08-06 DIAGNOSIS — E66.09 OBESITY DUE TO EXCESS CALORIES IN PEDIATRIC PATIENT, UNSPECIFIED BMI, UNSPECIFIED WHETHER SERIOUS COMORBIDITY PRESENT: ICD-10-CM

## 2025-08-06 PROCEDURE — G8417 CALC BMI ABV UP PARAM F/U: HCPCS | Performed by: NURSE PRACTITIONER

## 2025-08-06 PROCEDURE — 4004F PT TOBACCO SCREEN RCVD TLK: CPT | Performed by: NURSE PRACTITIONER

## 2025-08-06 PROCEDURE — G8427 DOCREV CUR MEDS BY ELIG CLIN: HCPCS | Performed by: NURSE PRACTITIONER

## 2025-08-06 PROCEDURE — 99214 OFFICE O/P EST MOD 30 MIN: CPT | Performed by: NURSE PRACTITIONER

## 2025-08-06 RX ORDER — OXYCODONE AND ACETAMINOPHEN 7.5; 325 MG/1; MG/1
1 TABLET ORAL EVERY 8 HOURS PRN
Qty: 84 TABLET | Refills: 0 | Status: SHIPPED | OUTPATIENT
Start: 2025-08-06 | End: 2025-09-03